# Patient Record
Sex: FEMALE | Race: WHITE | NOT HISPANIC OR LATINO | Employment: OTHER | URBAN - METROPOLITAN AREA
[De-identification: names, ages, dates, MRNs, and addresses within clinical notes are randomized per-mention and may not be internally consistent; named-entity substitution may affect disease eponyms.]

---

## 2021-11-11 ENCOUNTER — TRANSFERRED RECORDS (OUTPATIENT)
Dept: HEALTH INFORMATION MANAGEMENT | Facility: CLINIC | Age: 51
End: 2021-11-11
Payer: COMMERCIAL

## 2021-11-17 ENCOUNTER — MEDICAL CORRESPONDENCE (OUTPATIENT)
Dept: HEALTH INFORMATION MANAGEMENT | Facility: CLINIC | Age: 51
End: 2021-11-17
Payer: COMMERCIAL

## 2021-11-17 ENCOUNTER — TRANSFERRED RECORDS (OUTPATIENT)
Dept: HEALTH INFORMATION MANAGEMENT | Facility: CLINIC | Age: 51
End: 2021-11-17
Payer: COMMERCIAL

## 2021-11-22 ENCOUNTER — TRANSCRIBE ORDERS (OUTPATIENT)
Dept: OTHER | Age: 51
End: 2021-11-22
Payer: COMMERCIAL

## 2021-11-22 DIAGNOSIS — F41.9 ANXIETY: Primary | ICD-10-CM

## 2021-11-22 DIAGNOSIS — M62.559 MUSCLE WASTING AND ATROPHY, NOT ELSEWHERE CLASSIFIED, UNSPECIFIED THIGH: ICD-10-CM

## 2021-11-24 ENCOUNTER — TRANSFERRED RECORDS (OUTPATIENT)
Dept: HEALTH INFORMATION MANAGEMENT | Facility: CLINIC | Age: 51
End: 2021-11-24
Payer: COMMERCIAL

## 2021-12-27 NOTE — TELEPHONE ENCOUNTER
RECORDS RECEIVED FROM: External   REASON FOR VISIT: Muscle wasting and atrophy, not elsewhere classified, unspecified thigh   Date of Appt: 3/14/22   NOTES (FOR ALL VISITS) STATUS DETAILS   OFFICE NOTE from referring provider Received Dr Gooden @ [facility]:  11/17/21  10/26/21   OFFICE NOTE from other specialist N/A    DISCHARGE SUMMARY from hospital N/A    DISCHARGE REPORT from the ER N/A    OPERATIVE REPORT N/A    MEDICATION LIST Received    IMAGING  (FOR ALL VISITS)     EMG Received [facility]:  11/11/21   EEG N/A    LUMBAR PUNCTURE N/A    ANTONIO SCAN N/A    ULTRASOUND (CAROTID BILAT) *VASCULAR* N/A    MRI (HEAD, NECK, SPINE) N/A    CT (HEAD, NECK, SPINE) N/A

## 2022-03-14 ENCOUNTER — OFFICE VISIT (OUTPATIENT)
Dept: NEUROLOGY | Facility: CLINIC | Age: 52
End: 2022-03-14
Attending: PSYCHIATRY & NEUROLOGY
Payer: COMMERCIAL

## 2022-03-14 ENCOUNTER — PRE VISIT (OUTPATIENT)
Dept: NEUROLOGY | Facility: CLINIC | Age: 52
End: 2022-03-14

## 2022-03-14 ENCOUNTER — LAB (OUTPATIENT)
Dept: LAB | Facility: CLINIC | Age: 52
End: 2022-03-14
Attending: PSYCHIATRY & NEUROLOGY
Payer: COMMERCIAL

## 2022-03-14 VITALS
HEART RATE: 70 BPM | SYSTOLIC BLOOD PRESSURE: 144 MMHG | WEIGHT: 149 LBS | RESPIRATION RATE: 16 BRPM | DIASTOLIC BLOOD PRESSURE: 59 MMHG | OXYGEN SATURATION: 100 %

## 2022-03-14 DIAGNOSIS — G72.41 IBM (INCLUSION BODY MYOSITIS) (H): ICD-10-CM

## 2022-03-14 DIAGNOSIS — R29.898 PROXIMAL LEG WEAKNESS: Primary | ICD-10-CM

## 2022-03-14 DIAGNOSIS — R29.898 DECREASED GRIP STRENGTH OF LEFT HAND: ICD-10-CM

## 2022-03-14 DIAGNOSIS — R09.A2 SENSATION OF FOREIGN BODY IN THROAT: ICD-10-CM

## 2022-03-14 PROCEDURE — 81382 HLA II TYPING 1 LOC HR: CPT | Performed by: PATHOLOGY

## 2022-03-14 PROCEDURE — 99000 SPECIMEN HANDLING OFFICE-LAB: CPT | Performed by: PATHOLOGY

## 2022-03-14 PROCEDURE — 99205 OFFICE O/P NEW HI 60 MIN: CPT | Performed by: PSYCHIATRY & NEUROLOGY

## 2022-03-14 PROCEDURE — 83520 IMMUNOASSAY QUANT NOS NONAB: CPT | Mod: 90 | Performed by: PATHOLOGY

## 2022-03-14 PROCEDURE — 36415 COLL VENOUS BLD VENIPUNCTURE: CPT | Performed by: PATHOLOGY

## 2022-03-14 RX ORDER — HYDROCHLOROTHIAZIDE 12.5 MG/1
TABLET ORAL
COMMUNITY

## 2022-03-14 RX ORDER — KRILL/OM-3/DHA/EPA/PHOSPHO/AST 500-110 MG
640 CAPSULE ORAL
COMMUNITY

## 2022-03-14 RX ORDER — ZOLPIDEM TARTRATE 5 MG/1
TABLET ORAL
COMMUNITY

## 2022-03-14 RX ORDER — ESCITALOPRAM OXALATE 10 MG/1
10 TABLET ORAL DAILY
COMMUNITY
End: 2024-08-13

## 2022-03-14 RX ORDER — AMLODIPINE BESYLATE 5 MG/1
TABLET ORAL
COMMUNITY

## 2022-03-14 ASSESSMENT — PAIN SCALES - GENERAL: PAINLEVEL: NO PAIN (0)

## 2022-03-14 NOTE — PATIENT INSTRUCTIONS
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 If the NT5 antibody is positive I don't think you will need a muscle biopsy. If it is negative, I would do the biopsy. Results expected in about 1 month  PT and OT referrals (ordered)  ENT referral for tickling in throat and coughing spells.    I don't see a strong need to assess PFT today but we could do this at follow up.   Follow up 6 months

## 2022-03-14 NOTE — PROGRESS NOTES
Service Date: 2022    Huy Gooden MD  [facility]  [address], [address]  [place]  [address]    RE:  Maria A Tanner  MRN:  [id number]  :   1970    Dear Dr. Gooden:    I had the pleasure to see Maria A Tanner at the [facility].  Thank you for this referral.  She is a 51-year-old woman.  Her mother has inclusion body myositis and she is my patient (Ruby Hull).  I have been following her mother for several years.  Maria A got concerned that she has similar symptoms to her mother.  Her concerns began around 10/2021.  She noticed wasting of the quadriceps muscles, particularly the vastus medialis, initially on the right and then on the left.  She has noticed that it is a little difficult to get up from the ground or low-seated chairs now, and she does not ascend stairs as briskly as she used to.  She does not need to use her arms to get up from the chair.  She had 1 near fall but otherwise no falls. She is not using a cane or walker, and denies difficulty turning in bed or tripping on her foot. For at least 2-3 months she has noticed some weakness of her hand , left more than right, and loss of muscle bulk including the FDI on both hands.  She does not report any fasciculations.  She sometimes has trouble extending her left arm all the way back when she does yoga.  Otherwise, no proximal arm weakness.  She is more clumsy with fine hand movements, although she can do all those independently.     She notes an occasional tickle in her throat for the past couple of years that trigger coughing spells, especially at night, that are becoming uncomfortable.  She feels that sometimes food may be going down the wrong way, either solid or liquid, although she has not had any michell choking episodes and her nutrition has not been compromised.  She denies dyspnea on exertion or orthopnea.  She does not report any change in her voice or speech.   No cognitive concerns voiced by her .      Her mother, in addition to the inclusion body myositis, has been diagnosed with Alzheimer's and epilepsy; she had a muscle biopsy at [facility] which showed all the features of IBM diagnosis including focal invasion of non-necrotic muscle fibers by lymphocytes, rimmed vacuoles and congophilic inclusions.  Mother had genetic testing which showed a variant of unknown significance in MEGF10 gene, which is not associated with hereditary inclusion body myopathies.  There was also pseudodeficiency allele in the GAA gene, also completely unrelated.  Mother did not have any mutations in VCP, desmin, HNRNPA2 or HNRNPB1 genes, which are associated with hereditary forms of IBM.  This was negative.      Maria A has a history of hypertension but otherwise free past medical history.    SOCIAL HISTORY: She does not smoke.  She drinks alcohol a couple of times a week.    CURRENT MEDICATIONS:  Amlodipine, calcium and vitamin D, citalopram, HCTZ, krill oil and Ambien.    ALLERGIES:  Amoxicillin.    BP (!) 144/59   Pulse 70   Resp 16   Wt 67.6 kg (149 lb)   SpO2 100%     PHYSICAL EXAMINATION:  She is awake, alert, oriented x3.  Her cranial nerve exam shows no ptosis or ophthalmoparesis.  Ductions and versions of the eyes are normal.  There is no weakness of orbicularis oculi, oris, uvula, jaw, palate or tongue.  No tongue atrophy or fasciculations.  Lateral tongue movements are done fast and strong.  There is no jaw jerk.  There is no dysphonia or dysarthria.  Her neck flexion and extension strength are full.  Her strength is 5/5 in bilateral deltoids, biceps, triceps and wrist extensors.  Finger extensors are right 5, left 4+, very slightly weak.  FDI and APB are bilaterally 5 despite very subtle atrophy of FDI on both sides.  There are no fasciculations in the upper or lower extremities.  There is mild atrophy of the forearm, left more than right, and there is definite weakness  of the long finger flexors, especially FDP, and FDP to digit 2 on both sides.  I would rate it 4+ on the right, 4 on the left.  Thenar muscle bulk is better preserved.  Her strength is 4+ out of 5 for hip flexors.  I would say 4 for knee extensors, which are a bit weaker than the hip flexors.  There is prominent atrophy of the vastus medialis and lateralis, right more than left. Hamstring strength is normal and so is foot dorsiflexion, and great toe extension.  Tone is normal.  Reflexes are 2+ at the right ankle, 3+ at the left, 2+ at the knees, 2+ at triceps and brachioradialis, biceps is right 2+, left trace to 1+.  Sensory exam is intact to vibration, light touch and pinprick.  Finger-to-nose is done without dysmetria.  I did not examine her gait.    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  She had myopathic changes on EMG done at [facility] without irritability.  She has selective weakness of the quadriceps and long finger flexors which is very typical for inclusion body myositis.  I told her that this is a very unusual situation.  Inclusion body myositis is most often a sporadic disease and it is rarely inherited from generation to generation.  However, she may belong to 2%-3% of IBM families where the trait can be passed in an autosomal dominant fashion, and this is related to a certain HLA subtype (DR3).  We will do HLA typing to test it.  I will have to emphasize that this is distinctly different from the hereditary inclusion body myopathies associated with VCP, desmin or other mutations.  Those have an earlier age of onset and quite different pattern of weakness, not selectively affecting the quadriceps and finger flexors.  Her mother was tested for those mutations and turned out negative.      Maria A understands that there is no disease-modifying treatment currently for IBM and it is a slowly progressive muscle disease, which after years can cause significant disability.  Right now, we will  have her evaluated by Physical and Occupational Therapy.  For the tickling sensation in her throat that triggers coughing spells, I will refer her to ENT, in case this is an early manifestation of IBM or something else.      She asked me whether we should do pulmonary function tests today, and I do not have a strong reason to do that, but we can check it at the followup appointment.  We will also test [id number] IgG antibodies.  This is a serum biomarker of inclusion body myositis with modest sensitivity of about 60%.  The specificity, however, is high, so if the test returns positive with this clinical scenario, she may not need a muscle biopsy.  If the test returns negative, however, I would do the biopsy to confirm, with the caveat that biopsies done in the first 2-3 years after symptom onset often do not show all the canonical pathologic features of IBM and this still does not exclude the diagnosis.  Xenia understands those limitations very well.  She will be seen forfollowup in our clinic in 6 months or sooner if needed.    Total time spent on this encounter today 60 minutes, including 40 face-to-face, 10 on post-visit note dictation, editing and orders, and 10 in pre-visit chart review.    Sincerely,        Pepe Dick MD    cc:  Mila Dick MD        D: 2022   T: 2022   MT: jude    Name:     XENIA GABRIEL  MRN:      [id number]        Account:      [id number]   :      1970           Service Date: 2022       Document: [id number]

## 2022-03-14 NOTE — LETTER
3/14/2022       RE: Maria A Love  [address] [address] [place] [id number]     Dear Colleague,    Thank you for referring your patient, Maria A Love, to the [facility] [place] [facility]. Please see a copy of my visit note below.    Service Date: 2022    Huy Gooden MD  [facility]  [address]  [place] [id number]    RE:  Maria A Tanner  MRN:  [id number]  :   1970    Dear Dr. Gooden:    I had the pleasure to see Maria A Tanner at the [facility].  Thank you for this referral.  She is a 51-year-old woman.  Her mother has inclusion body myositis and she is my patient (Ruby Hull).  I have been following her mother for several years.  Maria A got concerned that she has similar symptoms to her mother.  Her concerns began around 10/2021.  She noticed wasting of the quadriceps muscles, particularly the vastus medialis, initially on the right and then on the left.  She has noticed that it is a little difficult to get up from the ground or low-seated chairs now, and she does not ascend stairs as briskly as she used to.  She does not need to use her arms to get up from the chair.  She had 1 near fall but otherwise no falls. She is not using a cane or walker, and denies difficulty turning in bed or tripping on her foot. For at least 2-3 months she has noticed some weakness of her hand , left more than right, and loss of muscle bulk including the FDI on both hands.  She does not report any fasciculations.  She sometimes has trouble extending her left arm all the way back when she does yoga.  Otherwise, no proximal arm weakness.  She is more clumsy with fine hand movements, although she can do all those independently.     She notes an occasional tickle in her throat for the past couple of years that trigger coughing spells, especially at night,  that are becoming uncomfortable.  She feels that sometimes food may be going down the wrong way, either solid or liquid, although she has not had any michell choking episodes and her nutrition has not been compromised.  She denies dyspnea on exertion or orthopnea.  She does not report any change in her voice or speech.  No cognitive concerns voiced by her .      Her mother, in addition to the inclusion body myositis, has been diagnosed with Alzheimer's and epilepsy; she had a muscle biopsy at [facility] which showed all the features of IBM diagnosis including focal invasion of non-necrotic muscle fibers by lymphocytes, rimmed vacuoles and congophilic inclusions.  Mother had genetic testing which showed a variant of unknown significance in MEGF10 gene, which is not associated with hereditary inclusion body myopathies.  There was also pseudodeficiency allele in the GAA gene, also completely unrelated.  Mother did not have any mutations in VCP, desmin, HNRNPA2 or HNRNPB1 genes, which are associated with hereditary forms of IBM.  This was negative.      Maria A has a history of hypertension but otherwise free past medical history.    SOCIAL HISTORY: She does not smoke.  She drinks alcohol a couple of times a week.    CURRENT MEDICATIONS:  Amlodipine, calcium and vitamin D, citalopram, HCTZ, krill oil and Ambien.    ALLERGIES:  Amoxicillin.    BP (!) 144/59   Pulse 70   Resp 16   Wt 67.6 kg (149 lb)   SpO2 100%     PHYSICAL EXAMINATION:  She is awake, alert, oriented x3.  Her cranial nerve exam shows no ptosis or ophthalmoparesis.  Ductions and versions of the eyes are normal.  There is no weakness of orbicularis oculi, oris, uvula, jaw, palate or tongue.  No tongue atrophy or fasciculations.  Lateral tongue movements are done fast and strong.  There is no jaw jerk.  There is no dysphonia or dysarthria.  Her neck flexion and extension strength are full.  Her strength is 5/5 in bilateral deltoids, biceps,  triceps and wrist extensors.  Finger extensors are right 5, left 4+, very slightly weak.  FDI and APB are bilaterally 5 despite very subtle atrophy of FDI on both sides.  There are no fasciculations in the upper or lower extremities.  There is mild atrophy of the forearm, left more than right, and there is definite weakness of the long finger flexors, especially FDP, and FDP to digit 2 on both sides.  I would rate it 4+ on the right, 4 on the left.  Thenar muscle bulk is better preserved.  Her strength is 4+ out of 5 for hip flexors.  I would say 4 for knee extensors, which are a bit weaker than the hip flexors.  There is prominent atrophy of the vastus medialis and lateralis, right more than left. Hamstring strength is normal and so is foot dorsiflexion, and great toe extension.  Tone is normal.  Reflexes are 2+ at the right ankle, 3+ at the left, 2+ at the knees, 2+ at triceps and brachioradialis, biceps is right 2+, left trace to 1+.  Sensory exam is intact to vibration, light touch and pinprick.  Finger-to-nose is done without dysmetria.  I did not examine her gait.    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  She had myopathic changes on EMG done at [facility] without irritability.  She has selective weakness of the quadriceps and long finger flexors which is very typical for inclusion body myositis.  I told her that this is a very unusual situation.  Inclusion body myositis is most often a sporadic disease and it is rarely inherited from generation to generation.  However, she may belong to 2%-3% of IBM families where the trait can be passed in an autosomal dominant fashion, and this is related to a certain HLA subtype (DR3).  We will do HLA typing to test it.  I will have to emphasize that this is distinctly different from the hereditary inclusion body myopathies associated with VCP, desmin or other mutations.  Those have an earlier age of onset and quite different pattern of weakness, not  selectively affecting the quadriceps and finger flexors.  Her mother was tested for those mutations and turned out negative.      Xenia understands that there is no disease-modifying treatment currently for IBM and it is a slowly progressive muscle disease, which after years can cause significant disability.  Right now, we will have her evaluated by Physical and Occupational Therapy.  For the tickling sensation in her throat that triggers coughing spells, I will refer her to ENT, in case this is an early manifestation of IBM or something else.      She asked me whether we should do pulmonary function tests today, and I do not have a strong reason to do that, but we can check it at the followup appointment.  We will also test [id number] IgG antibodies.  This is a serum biomarker of inclusion body myositis with modest sensitivity of about 60%.  The specificity, however, is high, so if the test returns positive with this clinical scenario, she may not need a muscle biopsy.  If the test returns negative, however, I would do the biopsy to confirm, with the caveat that biopsies done in the first 2-3 years after symptom onset often do not show all the canonical pathologic features of IBM and this still does not exclude the diagnosis.  Xenia understands those limitations very well.  She will be seen forfollowup in our clinic in 6 months or sooner if needed.    Total time spent on this encounter today 60 minutes, including 40 face-to-face, 10 on post-visit note dictation, editing and orders, and 10 in pre-visit chart review.    Pepe Dick MD      cc:  Mila Perez          D: 2022   T: 2022   MT: jude    Name:     XENIA GABRIEL  MRN:      [id number]        Account:      [id number]   :      1970           Service Date: 2022     Document: [id number]

## 2022-03-14 NOTE — NURSING NOTE
Chief Complaint   Patient presents with     Consult     NEW - muscle wasting and atrophy     Kai Dee

## 2022-03-21 NOTE — TELEPHONE ENCOUNTER
FUTURE VISIT INFORMATION      FUTURE VISIT INFORMATION:    Date: 6/23/22    Time: 1PM    Location: [facility]  REFERRAL INFORMATION:    Referring provider:  Pepe Dick MD    Referring providers clinic:  [facility]     Reason for visit/diagnosis  IBM (inclusion body myositis)/Sensation of foreign body in throat - Referred by Pepe Dick MD in [facility]    RECORDS REQUESTED FROM:       Clinic name Comments Records Status Imaging Status   [facility]  3/14/22 note and referral from Pepe Dick MD Epic

## 2022-03-22 LAB
DPA1*: NORMAL
DPB1*: NORMAL
DPB1*LOCUS: NORMAL
DQA1*: NORMAL
DQA1*LOCUS: NORMAL
DQB1*: NORMAL
DQB1*LOCUS SEROLOGIC EQUIVALENT: 2
DQB1*LOCUS: NORMAL
DQB1*SEROLOGIC EQUIVALENT: 7
DRB1*: NORMAL
DRB1*LOCUS SEROLOGIC EQUIVALENT: 17
DRB1*LOCUS: NORMAL
DRB1*SEROLOGIC EQUIVALENT: 11
DRB3*LOCUS SEROLOGIC EQUIVALENT: 52
DRB3*LOCUS: NORMAL
DRSSO TEST METHOD: NORMAL
ZZZDRNGS COMMENTS: NORMAL

## 2022-04-03 ENCOUNTER — HEALTH MAINTENANCE LETTER (OUTPATIENT)
Age: 52
End: 2022-04-03

## 2022-04-11 LAB — SCANNED LAB RESULT: NORMAL

## 2022-04-25 LAB — SCANNED LAB RESULT: NORMAL

## 2022-05-25 ENCOUNTER — HOSPITAL ENCOUNTER (OUTPATIENT)
Dept: PHYSICAL THERAPY | Facility: REHABILITATION | Age: 52
Discharge: HOME | End: 2022-05-25
Payer: COMMERCIAL

## 2022-05-25 ENCOUNTER — HOSPITAL ENCOUNTER (OUTPATIENT)
Dept: OCCUPATIONAL THERAPY | Facility: REHABILITATION | Age: 52
Discharge: HOME | End: 2022-05-25
Attending: PSYCHIATRY & NEUROLOGY
Payer: COMMERCIAL

## 2022-05-25 DIAGNOSIS — R29.898 PROXIMAL LEG WEAKNESS: Primary | ICD-10-CM

## 2022-05-25 DIAGNOSIS — R29.898 DECREASED GRIP STRENGTH OF LEFT HAND: ICD-10-CM

## 2022-05-25 DIAGNOSIS — G72.41 IBM (INCLUSION BODY MYOSITIS) (H): ICD-10-CM

## 2022-05-25 DIAGNOSIS — Z78.9 DECREASED ACTIVITIES OF DAILY LIVING (ADL): Primary | ICD-10-CM

## 2022-05-25 DIAGNOSIS — M79.674 GREAT TOE PAIN, RIGHT: ICD-10-CM

## 2022-05-25 PROCEDURE — 97535 SELF CARE MNGMENT TRAINING: CPT | Mod: GO | Performed by: OCCUPATIONAL THERAPIST

## 2022-05-25 PROCEDURE — 97165 OT EVAL LOW COMPLEX 30 MIN: CPT | Mod: GO | Performed by: OCCUPATIONAL THERAPIST

## 2022-05-25 PROCEDURE — 97161 PT EVAL LOW COMPLEX 20 MIN: CPT | Mod: GP | Performed by: PHYSICAL THERAPIST

## 2022-05-25 PROCEDURE — 97110 THERAPEUTIC EXERCISES: CPT | Mod: GP | Performed by: PHYSICAL THERAPIST

## 2022-05-26 NOTE — PROGRESS NOTES
05/25/22 1100   General Information   Type of Visit Initial OP Ortho PT Evaluation   Start of Care Date 05/25/22   Referring Physician Pepe Dick MD   Patient/Family Goals Statement To preserve my strength for as long as possible   Orders Evaluate and Treat   Date of Order 03/14/22   Certification Required? No   Medical Diagnosis R29.898 (ICD-10-CM) - Proximal leg weakness;G72.41 (ICD-10-CM) - IBM (inclusion body myositis)   Surgical/Medical history reviewed Yes   Precautions/Limitations no known precautions/limitations       Present No   Body Part(s)   Body Part(s) Hip;Lumbar Spine/SI;Knee   Presentation and Etiology   Pertinent history of current problem (include personal factors and/or comorbidities that impact the POC) The patient reports that she doesn't have an official diagnosis.  Her hands are weak and her quads are disappearing.  She feels like she needs lower back support when sitting because of lack of abdominal strength.  She has several lumbar pillows throughout her home for support.  Her neck has also been bothering her from posturing when biking.  She does get fatigued, has difficulty getting up from a chair, stairs, hiking longer distances.   Impairments F. Decreased strength and endurance   Functional Limitations perform desired leisure / sports activities;perform activities of daily living   Symptom Location Bilateral lower extremities R > L; R great toe   How/Where did it occur From insidious onset   Onset date of current episode/exacerbation 03/14/22   Chronicity Chronic   Pain rating (0-10 point scale) Best (/10);Worst (/10)   Best (/10) 0   Worst (/10) 8   Pain quality A. Sharp;H. Other   Pain quality comment tight   Frequency of pain/symptoms C. With activity   Pain/symptoms are: The same all the time   Pain/symptoms exacerbated by G. Certain positions   Pain/symptoms eased by E. Changing positions   Progression of symptoms since onset: Other   Pain  progression comment Toe getting worse, knee is better overall   Current Level of Function   Patient role/employment history A. Employed   Employment Comments Artist   Living environment [facility]   Fall Risk Screen   Fall screen completed by PT   Have you fallen 2 or more times in the past year? No   Have you fallen and had an injury in the past year? No   Is patient a fall risk? No   Abuse Screen (yes response referral indicated)   Feels Unsafe at Home or Work/School no   Feels Threatened by Someone no   Does Anyone Try to Keep You From Having Contact with Others or Doing Things Outside Your Home? no   Physical Signs of Abuse Present no   Patient needs abuse support services and resources No   System Outcome Measures   Outcome Measures   (LEFS: 62/80)   Knee Objective Findings   Knee Special Test Comments APTA STS: 12 in 30 seconds   Hip Objective Findings   Hip Flexibility Comments WNL   Hip/Knee Strength Comments 4/5 hip abduction/adduction, knee extension R; 4+/5 L   Lumbar Spine/SI Objective Findings   Flexion ROM Increased muscle tone R side; WNL   Extension ROM WNL   Right Side Bending ROM WNL   Left Side Bending ROM WNL   Palpation 1st MET head larger R compared to L and tenderness noted   Posture Min increased lumbar lordosis   Planned Therapy Interventions   Planned Therapy Interventions neuromuscular re-education;ROM;strengthening;stretching   Planned Modality Interventions   Planned Modality Interventions Electrical stimulation   Clinical Impression   Criteria for Skilled Therapeutic Interventions Met yes, treatment indicated   PT Diagnosis Impaired LE strength secondary to likely IBM   Influenced by the following impairments Strength, pain   Functional limitations due to impairments Yoga, stairs, getting up from a chair   Clinical Presentation Evolving/Changing   Clinical Presentation Rationale Progressive disease   Clinical Decision Making (Complexity) Moderate complexity   Therapy Frequency   (1x  every other week to every 2 weeks)   Predicted Duration of Therapy Intervention (days/wks) 24 weeks, 12 visits   Risk & Benefits of therapy have been explained Yes   Patient, Family & other staff in agreement with plan of care Yes   Clinical Impression Comments Maria A Love is a 52 year old female who presents to PT with c/o progressive muscle weakness and R great toe pain.  Initial onset of symptoms was 2+ years ago with great toe pain beginning more recently.  She has PMH positive for no known injury, but family hx of IBM.  Her pain symptoms are rated 0-8/10 and described as sharp and tight in nature.  This pain is making it difficult for her to participate in yoga, and the progressive muscle weakness is making functional tasks such as stairs and getting up from a chair more difficult.  On exam, she demonstrates impaired LE muscle strength R > L, most evident in the quadriceps musculature.  She demonstrates symptoms of IBM, but has not yet received an official diagnosis.  She is appropriate for skilled 1:1 PT services to address the listed impairments and maintain functional independence.   ORTHO GOALS   PT Ortho Eval Goals 1;2;3;4   Ortho Goal 1   Goal Identifier Self-management/HEP   Goal Description Patient will be independent in self-management of condition and HEP.   Target Date 08/03/22   Ortho Goal 2   Goal Identifier APTA STS   Goal Description Patient will improve APTA STS to 14 repetitions in 30 seconds to demonstrate improved functional strength.   Target Date 08/03/22   Ortho Goal 3   Goal Identifier Stairs   Goal Description Patient will be able to maneuver stairs in her home without difficulty to demonstrate improved function.   Target Date 08/03/22   Ortho Goal 4   Goal Identifier Yoga   Goal Description Patient will be able to participate in yoga 1x/week without pain in her R great toe.   Target Date 08/03/22   Total Evaluation Time   PT Yon Low Complexity Minutes ([id number]) 25     Kasandra HULL  Magdaleno, PT, DPT, [facility]  5/25/2022

## 2022-05-27 DIAGNOSIS — Z11.59 ENCOUNTER FOR SCREENING FOR OTHER VIRAL DISEASES: Primary | ICD-10-CM

## 2022-06-01 ENCOUNTER — TELEPHONE (OUTPATIENT)
Dept: NEUROLOGY | Facility: CLINIC | Age: 52
End: 2022-06-01
Payer: COMMERCIAL

## 2022-06-01 NOTE — TELEPHONE ENCOUNTER
[facility] Call Center    Phone Message    May a detailed message be left on voicemail: yes     Reason for Call: Other: Pt called and stated that she was told to leave a message for Gladis, Dr. Calvert's nurse if there were any questions regarding her muscle biopsy. Pt stated that there was some confusion on where the biopsy was being taken from. Please call back with futher information.      Action Taken: Message routed to:  [facility]    Travel Screening: Not Applicable

## 2022-06-02 ENCOUNTER — LAB (OUTPATIENT)
Dept: LAB | Facility: CLINIC | Age: 52
End: 2022-06-02
Attending: PSYCHIATRY & NEUROLOGY
Payer: COMMERCIAL

## 2022-06-02 DIAGNOSIS — Z11.59 ENCOUNTER FOR SCREENING FOR OTHER VIRAL DISEASES: ICD-10-CM

## 2022-06-02 PROCEDURE — U0005 INFEC AGEN DETEC AMPLI PROBE: HCPCS

## 2022-06-02 PROCEDURE — U0003 INFECTIOUS AGENT DETECTION BY NUCLEIC ACID (DNA OR RNA); SEVERE ACUTE RESPIRATORY SYNDROME CORONAVIRUS 2 (SARS-COV-2) (CORONAVIRUS DISEASE [COVID-19]), AMPLIFIED PROBE TECHNIQUE, MAKING USE OF HIGH THROUGHPUT TECHNOLOGIES AS DESCRIBED BY CMS-2020-01-R: HCPCS

## 2022-06-03 LAB — SARS-COV-2 RNA RESP QL NAA+PROBE: NEGATIVE

## 2022-06-06 ENCOUNTER — HOSPITAL ENCOUNTER (OUTPATIENT)
Facility: AMBULATORY SURGERY CENTER | Age: 52
Discharge: HOME | End: 2022-06-06
Attending: PSYCHIATRY & NEUROLOGY | Admitting: PSYCHIATRY & NEUROLOGY
Payer: COMMERCIAL

## 2022-06-06 VITALS
BODY MASS INDEX: 21.19 KG/M2 | OXYGEN SATURATION: 100 % | HEART RATE: 53 BPM | RESPIRATION RATE: 16 BRPM | HEIGHT: 70 IN | WEIGHT: 148 LBS | DIASTOLIC BLOOD PRESSURE: 67 MMHG | TEMPERATURE: 97 F | SYSTOLIC BLOOD PRESSURE: 132 MMHG

## 2022-06-06 DIAGNOSIS — R53.1 WEAKNESS: Primary | ICD-10-CM

## 2022-06-06 PROCEDURE — 88319 ENZYME HISTOCHEMISTRY: CPT | Mod: 26 | Performed by: PSYCHIATRY & NEUROLOGY

## 2022-06-06 PROCEDURE — 20205 DEEP MUSCLE BIOPSY: CPT | Performed by: PSYCHIATRY & NEUROLOGY

## 2022-06-06 PROCEDURE — 88305 TISSUE EXAM BY PATHOLOGIST: CPT | Mod: 26 | Performed by: PSYCHIATRY & NEUROLOGY

## 2022-06-06 PROCEDURE — 88313 SPECIAL STAINS GROUP 2: CPT | Mod: 26 | Performed by: PSYCHIATRY & NEUROLOGY

## 2022-06-06 PROCEDURE — 88314 HISTOCHEMICAL STAINS ADD-ON: CPT | Mod: 26 | Performed by: PSYCHIATRY & NEUROLOGY

## 2022-06-06 NOTE — OP NOTE
PREOPERATIVE DIAGNOSIS: Myopathy    POSTOPERATIVE DIAGNOSIS: Myopathy    OPERATION: Left vastus lateralis muscle biopsy    INDICATIONS FOR PROCEDURE: Rule out Inclusion Body Myositis    DESCRIPTION OF PROCEDURE:  Informed consent was obtained on the patient to do a deep left vastus lateralis muscle biopsy. The left thigh was prepped with cholorprep and sterilely draped. Lidocaine 1% 23 mL total was injected locally during the biopsy procedure. A 1-inch incision was made over lateral thigh and the vastus lateralis muscle was exposed. Three small pieces were biopsied for routine histochemistry, electron microscopy, and metabolic studies as needed. The fascia and subcutaneous tissues were closed with 3-0 Vicryl and skin with 4-0 Vicryl. Steri-Strips and pressure dressing were applied over the wound site. There were no complications. Blood loss was minimal. Wound care instructions were given to the patient. She told that results of the biopsy would be available in 2-3 weeks.

## 2022-06-06 NOTE — DISCHARGE INSTRUCTIONS
[facility]  Home Care Following Your Procedure  Call a doctor if you have signs of infection (fever, growing tenderness at the surgery site, a large amount of drainage or bleeding, severe pain, foul-smelling drainage, redness, swelling).         Tylenol/Acetaminophen Consumption  To help encourage the safe use of acetaminophen, the makers of TYLENOL  have lowered the maximum daily dose for single-ingredient Extra Strength TYLENOL  (acetaminophen) products sold in the [place] from 8 pills per day (4,000 mg) to 6 pills per day (3,000 mg). The dosing interval has also changed from 2 pills every 4-6 hours to 2 pills every 6 hours.  If you feel your pain relief is insufficient, you may take Tylenol/Acetaminophen in addition to your narcotic pain medication.   Be careful not to exceed 3,000 mg of Tylenol/Acetaminophen in a 24 hour period from all sources.  If you are taking extra strength Tylenol/acetaminophen (500 mg), the maximum dose is 6 tablets in 24 hours.  If you are taking regular strength acetaminophen (325 mg), the maximum dose is 9 tablets in 24 hours.  Your doctor is:  Dr. Jj Calvert, General Surgery: [phone]                                    Or dial [phone] and ask for the resident on call for:  General Surgery  For emergency care, call the:  [place]:  [phone] (TTY for hearing impaired: [phone])

## 2022-06-21 NOTE — PROGRESS NOTES
[facility]   at the [facility]   Otolaryngology Clinic     Patient: Maria A Love    MRN: [id number]    : 1970    Age/Gender: 52 year old female  Date of Service: 2022  Rendering Provider:   Rhoda Proctor MD     Referring Provider   PCP: Mila Perez  Referring Physician: Pepe Dick MD  [address] [place], [place] [id number], [place] [address]  Reason for Consultation   Chronic cough/throat clearing  Inclusion body myositis  History   HISTORY OF PRESENT ILLNESS: Ms. Love is a 52 year old female who presents to us today for consultation.      She presents today for evaluation. she reports:    Dysphonia  - denies    Dysphagia  - recent diagnosis of IBM    Dyspnea  - prior history of exercise induced asthma   - not anymore    Throat clearing/cough  - 12x in the past 3 days  - feels an itch  - she coughs so hard it is hard to control  - she has throat clearing   - the cough usually comes when she is talking  - last one she was in a pottery class  - she wakes up at night with a very dry mouth     GERD/LPRD   - denies   - had an endoscopy and was placed on prevacid for stomach discomfort    PAST MEDICAL HISTORY: No past medical history on file.    PAST SURGICAL HISTORY:   Past Surgical History:   Procedure Laterality Date     BIOPSY MUSCLE DIAGNOSTIC (LOCATION) Left 2022    Procedure: BIOPSY, MUSCLE LEFT QUADRICEPS;  Surgeon: Jj Calvert MD;  Location: [facility] OR     CURRENT MEDICATIONS:   Current Outpatient Medications:      amLODIPine (NORVASC) 5 MG tablet, amlodipine 5 mg tablet  TAKE 1 TABLET DAILY, Disp: , Rfl:      Calcium Carbonate (CALCARB 600 PO), , Disp: , Rfl:      cholecalciferol 50 MCG (2000) tablet, Take by mouth daily, Disp: , Rfl:      escitalopram (LEXAPRO) 10 MG tablet, Take 10 mg by mouth daily, Disp: , Rfl:      hydrochlorothiazide (HYDRODIURIL) 12.5 MG tablet, hydrochlorothiazide 12.5 mg tablet  TAKE 1 TABLET DAILY (NEED  APPOINTMENT), Disp: , Rfl:      Krill Oil (OMEGA-3) 500 MG CAPS, Take 640 mg by mouth, Disp: , Rfl:      zolpidem (AMBIEN) 5 MG tablet, zolpidem 5 mg tablet  Take 1 tablet as needed by oral route at bedtime., Disp: , Rfl:     ALLERGIES: Amoxicillin    SOCIAL HISTORY:    Social History     Socioeconomic History     Marital status:      Spouse name: Not on file     Number of children: Not on file     Years of education: Not on file     Highest education level: Not on file   Occupational History     Not on file   Tobacco Use     Smoking status: Not on file     Smokeless tobacco: Not on file   Substance and Sexual Activity     Alcohol use: Not on file     Drug use: Not on file     Sexual activity: Not on file   Other Topics Concern     Not on file   Social History Narrative     Not on file     Social Determinants of Health     Financial Resource Strain: Not on file   Food Insecurity: Not on file   Transportation Needs: Not on file   Physical Activity: Not on file   Stress: Not on file   Social Connections: Not on file   Intimate Partner Violence: Not on file   Housing Stability: Not on file       FAMILY HISTORY: No family history on file.  Non-contributory for problems with anesthesia    REVIEW OF SYSTEMS:   The patient was asked a 14 point review of systems regarding constitutional symptoms, eye symptoms, ears, nose, mouth, throat symptoms, cardiovascular symptoms, respiratory symptoms, gastrointestinal symptoms, genitourinary symptoms, musculoskeletal symptoms, integumentary symptoms, neurological symptoms, psychiatric symptoms, endocrine symptoms, hematologic/lymphatic symptoms, and allergic/ immunologic symptoms.   The pertinent factors have been included in the HPI and below.  Patient Supplied Answers to Review of Systems  [facility] ROS 6/20/2022   Neurology: Unexplained weakness   Ears, Nose, Throat: Trouble swallowing       Physical Examination   The patient underwent a physical examination as described  below. The pertinent positive and negative findings are summarized after the description of the examination.  Constitutional: The patient's developmental and nutritional status was assessed. The patient's voice quality was assessed.  Head and Face: The head and face were inspected for deformities. The sinuses were palpated. The salivary glands were palpated. Facial muscle strength was assessed bilaterally.  Eyes: Extraocular movements and primary gaze alignment were assessed.  Ears, Nose, Mouth and Throat: The ears and nose were examined for deformities. The nasal septum, mucosa, and turbinates were inspected by anterior rhinoscopy. The lips, teeth, and gums were examined for abnormalities. The oral mucosa, tongue, palate, tonsils, lateral and posterior pharynx were inspected for the presence of asymmetry or mucosal lesions.    Neck: The tracheal position was noted, and the neck mass palpated to determine if there were any asymmetries, abnormal neck masses, thyromegally, or thyroid nodules.  Respiratory: The nature of the breathing and chest expansion/symmetry was observed.  Cardiovascular: The patient was examined to determine the presence of any edema or jugular venous distension.  Abdomen: The contour of the abdomen was noted.  Lymphatic: The patient was examined for infraclavicular lymphadenopathy.  Musculoskeletal: The patient was inspected for the presence of skeletal deformities.  Extremities: The extremities were examined for any clubbing or cyanosis.  Skin: The skin was examined for inflammatory or neoplastic conditions.  Neurologic: The patient's orientation, mood, and affect were noted. The cranial nerve  functions were examined.  Other pertinent positive and negative findings on physical examination:      OC/OP: no lesions, uvula midline, soft palate elevates symmetrically  Neck: no lesions, no TH tenderness to palpation  All other physical examination findings were within normal limits and  noncontributory.  Procedures   Flexible laryngoscopy (CPT 89671)      Pre-procedure diagnosis: chronic throat clearing  Post-procedure diagnosis: same as above  Indication for procedure: Ms. Love is a 52 year old female with see above  Procedure(s): Fiberoptic Laryngoscopy    Details of Procedure: After informed consent was obtained, the patient was seated in the examination chair.  The areas of the nasopharynx as well as the hypopharynx were anesthetized with topical 4% lidocaine with 0.25% phenylephrine atomizer.  Examination of the base of tongue was performed first.  Attention was directed to any evidence of masses in the area or evidence of leukoplakia or candidal infection.  Attention was directed to the epiglottis where its size and position was determined and its movement on phonation of the vowel  e .  The piriform sinuses were then inspected for any mass lesions or pooling of secretions.  Attention was then directed to the larynx. The vocal folds were inspected for infection or any areas of leukoplakia, for masses, polypoid degeneration, or hemorrhage.  Having done this, the arytenoids and vocal processes were inspected for erythema or evidence of granuloma formation.  The posterior commissure was then inspected for evidence of inflammatory changes in the mucosa and heaping up of mucosal tissue. The patient was then instructed to say the vowel  e .  Adduction of vocal folds to the midline was observed for any evidence of paresis or paralysis of the larynx or asymmetry in rotation of the larynx to the left or right. The patient was asked to breathe and the degree of abduction was noted bilaterally.  Subglottic view of the larynx was obtained for any additional mass lesions or mucosal changes.  Finally the post cricoid was examined for evidence of pooling of secretions, as well as the pharyngeal wall mucosa.   Anesthesia type: 0.25% phenylephrine    Findings:  Anatomic/physiological deviations: RNC,  bilateral septal spurs, mild supraglottic hyperfunction   Right vocal process: No restriction of mobility   Left vocal process: No restriction of mobility  Glottal gap: Complete glottal closure  Supraglottic structures: Normal  Hypopharynx: Normal     Estimated Blood Loss: minimal  Complications: None  Disposition: Patient tolerated the procedure well          Fiberoptic Endoscopic Evaluation of Swallowing (CPT 12773)  and Interpretation of Swallowing (CPT 13236)    Indications: See above notes for complete history and physical. Patient complains of dysphagia to both solids and liquids and/or there is suggestion on history and endoscopic exam of the presence of dysphagia causing medical complaints.  Swallowing evaluation is being performed to assess the presence and degree of dysphagia, and to recommend a safe diet.     Pulmonary Status:  No PNA   Current Diet:              regular                                        thin liquids      Consistency Amounts:  Thin Liquid: sip   Puree: sip  Solid: cookies            Positioning: upright in a chair  Oral Peripheral Exam: See physical exam section.  Anatomic Notes: See Videostroboscopy report for assessment of anatomy and laryngeal functioning  Pharyngeal secretions prior to administration of liquid or food: No  Oral Phase Abnormal Findings: No abnormal behavior observed  Behavioral Adaptations: No abnormal behavior observed  Pharyngeal Phase Abnormal Findings: no penetration, no aspiration    Recommended Diet:  regular                                        thin liquids               Review of Relevant Clinical Data   I personally reviewed:  Notes: Dr. Vance Dick - Neurology 3/14/22    Labs:  No results found for: TSH  No results found for: NA, CO2, BUN, CREAT, GLUCOSE, PHOS  No results found for: WBC, HGB, HCT, MCV, PLT  No results found for: PT, PTT, INR  No results found for: ABE  No components found for: RHEUMATOIDFACTOR,  RF  No results found for:  "CRP  No components found for: CKTOT, URICACID  No components found for: C3, C4, DSDNAAB, NDNAABIFA  No results found for: MPOAB    Patient reported Quality of Life (QOL) Measures   Patient Supplied Answers To VHI Questionnaire  Voice Handicap Index (VHI-10) 6/20/2022   My voice makes it difficult for people to hear me 0   People have difficulty understanding me in a noisy room 0   My voice difficulties restrict my personal and social life.  0   I feel left out of conversations because of my voice 0   My voice problem causes me to lose income 0   I feel as though I have to strain to produce voice 0   The clarity of my voice is unpredictable 0   My voice problem upsets me 0   My voice makes me feel handicapped 0   People ask, \"What's wrong with your voice?\" 0   VHI-10 0     Patient Supplied Answers To EAT Questionnaire  Eating Assessment Tool (EAT-10) 6/20/2022   My swallowing problem has caused me to lose weight 1   My swallowing problem interferes with my ability to go out for meals 0   Swallowing liquids takes extra effort 0   Swallowing solids takes extra effort 1   Swallowing pills takes extra effort 1   Swallowing is painful 0   The pleasure of eating is affected by my swallowing 1   When I swallow food sticks in my throat 2   I cough when I eat 1   Swallowing is stressful 1   EAT-10 8     Patient Supplied Answers To CSI Questionnaire  Cough Severity Index (CSI) 6/20/2022   My cough is worse when I lie down 1   My coughing problem causes me to restrict my personal and social life 2   I tend to avoid places because of my cough problem 2   I feel embarrassed because of my coughing problem 2   People ask, ''What's wrong?'' because I cough a lot 1   I run out of air when I cough 2   My coughing problem affects my voice 2   My coughing problem limits my physical activity 1   My coughing problem upsets me 2   People ask me if I am sick because I cough a lot 1   CSI Score 16     Patient Supplied Answers to Dyspnea " Index Questionnaire:  No flowsheet data found.    Impression & Plan     IMPRESSION: Ms. Love is a 52 year old female who is being seen for the followin. Dysphagia  - avoids certain foods   - has a recent diagnosis of IBM  - FEES shows no penetration, aspiration or residue   Plan  - Xray Video Swallow Exam with esophagram in the future if develops swallow symptoms      2. Chronic cough/throat clearing  - started 3 years ago   - has stayed the same overtime  - associated with sensation of tickle when she is talking and gives her a big coughing fit that is significant - about 12x/ in the past 3 years  - she also has throat clearing  - allergy triggers and work up: denies  - pulmonary triggers and work up: had prior exercise induced asthma, no longer has difficulty breathing  - GI triggers and work up: worst in the AM, wakes up at night with dry mouth, feels like she can't swallow  - ENT triggers and work up: feels something in the throat that makes her clear her throat, coughing fits are with talking  - scope shows mild supraglottic hyperfunction, no pooling of saliva   - 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 due to irritable larynx syndrome and muscle tension dysphonia  - AM symptoms could be due to reflux - reflux precautions - if not improved then likely due to dry mouth from snoring overnight   - nighttime symptoms of dry mouth are likely due to snoring from nasal septal spurs     Plan  - CXR PA/lateral  - cough/throat clearing suppression therapy  - reflux precautions  - if morning throat clearing not improved with reflux precautions - consider sleep evaluation        RETURN VISIT: as needed if swallowing worsens      Scribe Preparation Attestation:  I, Vanda Massey, a scribe, prepared the chart for today's encounter.         Thank you for the kind referral and for allowing me to share in the care of Ms. Love. If you have any questions, please do not hesitate to contact me.    Rhoda Proctor MD     Laryngology    [facility]  Department of  Otolaryngology - Head and Neck Surgery  Clinics & Surgery Center  [address] [place] [id number]  Appointment line: [phone]  Fax: [phone]  [email or web address]

## 2022-06-23 ENCOUNTER — OFFICE VISIT (OUTPATIENT)
Dept: OTOLARYNGOLOGY | Facility: CLINIC | Age: 52
End: 2022-06-23
Payer: COMMERCIAL

## 2022-06-23 ENCOUNTER — THERAPY VISIT (OUTPATIENT)
Dept: SPEECH THERAPY | Facility: CLINIC | Age: 52
End: 2022-06-23
Payer: COMMERCIAL

## 2022-06-23 ENCOUNTER — PRE VISIT (OUTPATIENT)
Dept: OTOLARYNGOLOGY | Facility: CLINIC | Age: 52
End: 2022-06-23

## 2022-06-23 VITALS
DIASTOLIC BLOOD PRESSURE: 84 MMHG | SYSTOLIC BLOOD PRESSURE: 132 MMHG | HEIGHT: 70 IN | WEIGHT: 145 LBS | HEART RATE: 66 BPM | OXYGEN SATURATION: 98 % | BODY MASS INDEX: 20.76 KG/M2

## 2022-06-23 DIAGNOSIS — G72.41 IBM (INCLUSION BODY MYOSITIS) (H): Primary | ICD-10-CM

## 2022-06-23 DIAGNOSIS — R05.9 COUGH: ICD-10-CM

## 2022-06-23 DIAGNOSIS — R09.89 THROAT CLEARING: ICD-10-CM

## 2022-06-23 DIAGNOSIS — R05.9 COUGH: Primary | ICD-10-CM

## 2022-06-23 DIAGNOSIS — G72.41 IBM (INCLUSION BODY MYOSITIS) (H): ICD-10-CM

## 2022-06-23 DIAGNOSIS — R09.A2 SENSATION OF FOREIGN BODY IN THROAT: ICD-10-CM

## 2022-06-23 DIAGNOSIS — R09.A2 GLOBUS SENSATION: Primary | ICD-10-CM

## 2022-06-23 DIAGNOSIS — J38.7 IRRITABLE LARYNX SYNDROME: ICD-10-CM

## 2022-06-23 DIAGNOSIS — R13.12 OROPHARYNGEAL DYSPHAGIA: ICD-10-CM

## 2022-06-23 PROCEDURE — 92612 ENDOSCOPY SWALLOW (FEES) VID: CPT | Mod: GN | Performed by: OTOLARYNGOLOGY

## 2022-06-23 PROCEDURE — 92524 BEHAVRAL QUALIT ANALYS VOICE: CPT | Mod: GN | Performed by: SPEECH-LANGUAGE PATHOLOGIST

## 2022-06-23 PROCEDURE — 99204 OFFICE O/P NEW MOD 45 MIN: CPT | Mod: 25 | Performed by: OTOLARYNGOLOGY

## 2022-06-23 PROCEDURE — 92613 ENDOSCOPY SWALLOW (FEES) I&R: CPT | Performed by: OTOLARYNGOLOGY

## 2022-06-23 PROCEDURE — 92610 EVALUATE SWALLOWING FUNCTION: CPT | Mod: GN | Performed by: SPEECH-LANGUAGE PATHOLOGIST

## 2022-06-23 ASSESSMENT — PAIN SCALES - GENERAL: PAINLEVEL: NO PAIN (0)

## 2022-06-23 NOTE — PROGRESS NOTES
Speech-Language Pathology Department   EVALUATION  [facility]  Clinical Swallow Evaluation with endoscopic view provided by MD      06/23/22 1300   General Information   Type Of Visit Initial   Start Of Care Date 06/23/22   Referring Physician Dr. Rhoda Proctor   Orders Evaluate And Treat   Orders Comment Clinical Swallow Evaluation with endoscopic visualization provided by ENT   Medical Diagnosis IBM   Precautions/limitations No Known Precautions/limitations   Hearing Adequate in quiet setting   Pertinent History of Current Problem/OT: Additional Occupational Profile Info Maria A Love is a 52-year-old woman who was referred to laryngology for globus sensation and cough by Dr. Dick. Her mother has IBM and she is undergoing work up for this disease. Today she reports having some intermittent difficulty with swallowing. She notes over memorial day weekend, she was eating bread and felt like it didn't go down so well. Since her mother has IBM, she is aware of the changes in swallowing function. She is hopeful to maintain swallow function as long as possible. She denies coughing in association with po intake but does have coughing which wakes her up at night and coughing that will be so remarkable, she ends up with a nose bleed.   Respiratory Status Room air   Prior Level Of Function Swallowing   Prior Level Of Function Comment Regular solids and thin liquids   General Observations Pt highly pleasant and cooperative throughout evaluation   Patient/family Goals Pt would like to optimize swallow function as long as possible.   Clinical Swallow Evaluation   Oral Musculature generally intact   Dentition present and adequate   Mucosal Quality good   Mandibular Strength and Mobility intact   Oral Labial Strength and Mobility WFL   Lingual Strength and Mobility WFL   Velar Elevation intact   Buccal Strength and Mobility intact   Laryngeal Function Cough;Throat  clear;Swallow;Voicing initiated   Clinical Swallow Eval: Thin Liquid Texture Trial   Mode of Presentation, Thin Liquids straw   Volume of Liquid or Food Presented 3 oz blue tinged milk   Oral Phase of Swallow WFL   Pharyngeal Phase of Swallow intact   Diagnostic Statement No aspiration/penetration noted on thin liquid trials.   Clinical Swallow Eval: Puree Solid Texture Trial   Mode of Presentation, Puree self-fed;spoon   Volume of Puree Presented tsp applesauce tinged blue x2   Oral Phase, Puree WFL   Pharyngeal Phase, Puree intact   Diagnostic Statement No aspiration/penetration noted on puree consistency trials.   Clinical Swallow Eval: Regular (Solid)   Mode of Presentation self-fed   Volume Presented 1 Dalia Doone Cookie   Oral Phase WFL   Pharyngeal Phase intact   Diagnostic Statement No aspriation/penetration noted on regular consistency solids.   Swallow Eval: Clinical Impressions   Skilled Criteria for Therapy Intervention No problems identified which require skilled intervention   Functional Assessment Scale (FAS) 7   Diet texture recommendations Thin liquids (level 0);Regular diet   Predicted Duration of Therapy Intervention (days/wks) Evaluation only   Risks and Benefits of Treatment have been explained. Yes   Patient, family and/or staff in agreement with Plan of Care Yes   Clinical Impression Comments Maria A Kate demonstrates safe functional oropharyngeal swallow. No aspiration/penetration noted on any consistency presented. Timely swallow on all trials. No pharyngeal residue demonstrated on any consistency after the completed swallow. Adequate ROM and strength of oral mechanism demonstrated. Good natural dentition. Adequate velar elevation. Recommend regular consistency solids and thin liquids. Sit upright for po intake. Encourage small bites/sips and slow rate. No further SLP services indicated at this time for swallowing. Pt will work with the on's voice therapy SLP team to decrease throat  clear/coughing and vocal hyperfunction. She will benefit from video swallow study at some point to further assess pharyngeal phase as her swallow symptoms change and increase.   Total Session Time   SLP Eval: oral/pharyngeal swallow function, clinical minutes (17788) 11   Total Evaluation Time 11       Thank you for the referral of Maria A Love. If you have any questions about this report, please contact me using the information below.      Richa Aleman MS, CCC-SLP  Speech-Language Pathology  [facility]  Department of Otolaryngology/D&T - 4th floor  Pager: [phone]  Phone: [phone]  Email: [email or web address]

## 2022-06-23 NOTE — LETTER
"6/23/2022       RE: Maria A Love  [id number] [address] [place] [id number]     Dear Colleague,    Thank you for referring your patient, Maria A Love, to the [facility]ICE [facility] [place] [facility]. Please see a copy of my visit note below.    [facility]  Allen Harper Jr., M.D., F.A.C.S.  Hailey Marvin M.D., M.P.H.  Rhoda Proctor M.D.  Dunia Son, Ph.D., CCC-SLP  Manjinder Cardenas, Ph.D., CCC-SLP  Tash Marin M.M. (voice), M.A., CCC-SLP  Rio Estevez M.M. (voice), M.A., CCC-SLP  NAKUL Baez (voice), M.S., CCC-SLP    [facility]  VOICE/SPEECH/BREATHING EVALUATION AND LARYNGEAL EXAMINATION REPORT    Patient: Maria A Love  Date of Visit: 6/23/2022    Clinician: Dunia Son, Ph.D., CCC/SLP  Seen in conjunction with: Dr. Rhoda Proctor  We are also joined by Swallow Specialty SLP Richa Aleman  Referring Physician: Dr. Dick    CHIEF COMPLAINT:  Cough and throat clear, as well as swallowing problems    HISTORY  Maria A Love is a 52 year old presenting today for evaluation of cough and throat clearing; please see Ms. Alemna's note regarding the swallowing problem.    Please refer to Dr. Proctor s dictation for a more complete history and impressions.     Salient history: She has a history significant for a recent diagnosis of inclusion body myositis.    COUGH/THROAT CLEARING    Onset/ inciting factors: Unclear    Progression: Unclear    History of intervention:    Current Symptoms:    Sensation of a tickle in the throat that leads to a cough; episodes become extended and violent, and end with epistaxis    Episodes can be triggered by talking    Episodes not triggered by environmental irritants or temperature changes    Rarely an episode is triggered by eating, these are not quite annual, but \"memorable\"    Improves with: Nothing; she drinks a lot of water, but does not think it matters    Also " has a frequent phlegmy throat clear, that she believes is familial, and a habit    No actual postnasal drainage, but she notes that she sniffs a lot, and also thinks this is a habit    SWALLOWING    Some early problems with swallowing (food will not go down) that she believes may be related to her new diagnosis of [employer], and she would like to prevent as much as possible    Please see Ms. Aleman's note        BREATHING    No complaints      OTHER PERTINENT HISTORY    Unknown; please refer to Dr. Proctor's note and elsewhere in this chart    OBJECTIVE FINDINGS  VOICE/ SPEECH/ NON-COMMUNICATIVE LARYNGEAL BEHAVIORS EVALUATION  An evaluation of the voice and cough was accomplished today; salient features are as follows:    Palpation of the laryngeal area shows:    NoTenderness of the thyrohyoid area     Palpation does not elicit desire to throat clear/cough    Cough/ Throat clear:    Not observed    Breathing pattern:    Appears within normal limits and adequate     No overt tension is evident.    Voice quality is characterized by    Very frequent glottal alvarez with an underlying normal voice quality; within cultural normal limits    Habitual pitch is within normal limits, in the range from approximately F3 to G3    Loudness is WNL and appropriate for the setting    Sustained vowels: All within normal limits    A singing task shows voice quality is consistent between speech and singing; WNL    In a pitch glide task to determine pitch range, she demonstrates normal pitch range    LARYNGEAL EXAMINATION    Endoscopic laryngeal examination was performed by:  Dr. Proctor  I provided the protocol of instructions for the patient.  Type of exam:   Flexible endoscopy with chip-tip technology    This exam shows:  Laryngeal and Vocal Fold Mucosa    Essentially healthy laryngeal mucosa    Presence of secretions is unremarkable; there does not appear to be a secretion management problem    Status of vocal fold mucosa:   o Within normal  limits, with no lesions and straight vibratory margins    Neurological and Functional Integrity of the Larynx    Vocal folds are mobile and meet at midline; movement is brisk and symmetric; exam is neurologically normal     Normal function is evident during a task of 20 quickly repeated vowels    Elongation of the vocal folds for pitch increase is within normal limits    On phonation, glottic closure is not pressed, and often there is a mild anterior gap that increases with talking, resulting in mildly weak glottic closure    Demonstration of a cough is normal; demonstration of a throat clear shows an aggressive throat clear with high impact to the mucosa    Supraglottic Function and Therapy Probes    Variable mild to moderate anterior-posterior constriction of the supraglottic larynx during connected speech    Variable mild to moderate ventricular approximation during phonation;     Supraglottic function during singing is improved    Stroboscopy was not warranted.  FEES was accomplished by Dr. Proctor and Ms. Aleman, showing a strong swallow currently.    Dr. Proctor and I reviewed this laryngeal exam with Ms. Love today, and I provided pertinent explanations:    Endoscopic findings are consistent with audio-perceptual assessment and patient Hx/complaints.    her symptoms are accounted for by a variety of mild findings, including probable mucosal irritation    Her glottal alvarez may be accounted for by the occasional weak and irregular glottic closure    One of her mucosal irritations may be due to her mild to moderate supraglottic constriction    The aggressive throat clear is most likely a cause of mucosal irritation    Because there appears to be a functional component to her symptoms, she would most likely benefit from functional speech therapy.    ASSESSMENT / PLAN  IMPRESSIONS:  This evaluation has resulted in the following diagnosis/diagnoses for Ms. Love  Chronic Cough (R05.3)  Chronic Throat Clearing  (R68.89)  Irritable Larynx Syndrome (ILS) (J38.7).      Laryngeal evaluation demonstrated a small and variable anterior glottic gap that may account for frequent glottal alvarez; aggressive throat clearing that may be a source of mucosal irritation; mild to moderate supraglottic constriction that may be a source of laryngeal irritation    Perceptual evaluation demonstrated frequent glottal alvarez cough and throat clear were not observed in today's; visit  RECOMMENDATIONS:     A course of speech therapy is recommended to optimize vocal technique and help reduce chronic cough, throat clear and mucosal irritation.    She demonstrates a Good prognosis for improvement given adherence to therapeutic recommendations. Therapeutic     Positive indicators: commitment to process; diagnosis is known to respond to functional treatment;     Negative indicators: none      Research: This patient was contacted by email about participation in research. May be eligible for cough study.      TREATMENT PLAN  Speech therapy    DURATION/FREQUENCY OF TREATMENT  3 weekly or bi-weekly one-hour sessions, with 2 monthly one-hour follow-up sessions  GOALS  Patient goal:    To understand the problem and fix it as much as possible  To reduce her cough and throat clearing to acceptable levels    Long-term goal(s): In 6  months, Ms. Love will:  1.  Report a week with no more than two episodes of coughing or throat-clearing per day, that do not last more than two seconds    This treatment plan was developed with the patient who agreed with the recommendations.      TOTAL SERVICE TIME: 50 minutes  EVALUATION OF VOICE AND RESONANCE ([address])  NO CHARGE FACILITY FEE       Dunia Son, Ph.D., [facility]-SLP  Speech-Language Pathologist  Director, [facility]  [phone]

## 2022-06-23 NOTE — PATIENT INSTRUCTIONS
1.  You were seen in the ENT Clinic today by . If you have any questions or concerns after your appointment, please call [phone]. Press option #1 for scheduling related needs. Press option #3 for Nurse advice.    2.   has recommended  the following:   - chest x ray to be done today if possible. We will contact you with results   - voice therapy   - look over LPRD information included below  Laryngopharyngeal Reflux Disorder (LPRD)     Reflux is a term that describes stomach contents that back up above the stomach. The stomach contents range depending on what is in the stomach at the time of this happening from very acidic to non-acidic. When the stomach contents backup happens to the level of the esophagus - this can cause indigestion, stomach upset, pain, heartburn, regurgitation. This is known as Gastroesophageal Reflux Disorder (GERD). When the stomach contents back up even higher to your throat - to the level of the entrance of your esophagus and your voice box or larynx - this is called extraesophageal reflux - atypical reflux or laryngopharyngeal reflux and can cause what is known as Laryngopharyngeal Reflux Disorder (LPRD).      Gastroesophageal Reflux Disorder (GERD) is a well-known problem in this [place], with advertisements for the many medications used to treat it commonly seen on TV.  GERD occurs when stomach acid makes its way back up the esophagus, causing indigestion, stomach upset, and, especially, heartburn.  The acid may travel all the way up the esophagus, and spill over onto the larynx.  This is especially common during sleep, when the individual is lying down, and acid does not have to fight gravity to move up the esophagus.  When symptoms of acid reflux are more apparent in the larynx or pharynx, then the disorder may be called Laryngopharyngeal Reflux Disorder (LPRD).    Some of the symptoms include:     a dry, choking sensation, especially during the night  voice quality  that is worst in the morning  a raw, burning sensation in the throat  pain in the throat, neck, or running from the back of the chin along the neck  frequent coughing, or desire to clear the throat, or mucus sensation  a rough, gritty voice quality  decline in voice quality, or comfort, with continued voice use  a sour taste in your mouth upon waking up  a lump in the throat or globus sensation   postnasal drip, without other nasal and allergic symptoms   sensation of difficulty inhaling - like paradoxical vocal fold motion or laryngospasm      Interestingly, many of the patients in the [facility] who have laryngeal symptoms (LPRD) do not have classic symptoms of GERD, such as stomach discomfort and heartburn.     Why does LPRD cause throat symptoms:   In the case of LPRD, when the stomach content spills over onto the larynx, it irritates the lining of the throat and the vocal folds and creates inflammation, which causes the vocal folds to vibrate unevenly.  Coughing and throat-clearing from the irritation can make the inflammation worse.  The resulting voice disorder is often related to the poor vibratory quality of the inflamed vocal folds and the muscle tension created by effortful attempts at compensation.     Anti-reflux medication and dietary precautions are the first line of treatment for GERD/LPRD.  Discuss medications with your doctor.  Some medications may have side effects if taken for a long time, and it will be important for you to consider these.  Functional voice therapy is useful to teach techniques for reducing effortful compensation and instruct the individual in improved laryngeal hygiene.     At the [facility], we do not hand out a list of foods and beverages that must be avoided.  Rather, we educate about types of foods and beverages known to cause reflux, and we will encourage you to systematically investigate which foods stimulate your own reflux.  See the back of this page for  dietary and lifestyle precautions for GERD/LPRD.  Also, you are encouraged to manage reflux under the care of a gastrointestinal specialist.     Lifestyle changes that may help reduce symptoms of GERD/LPRD  eat smaller meals more frequently throughout the day, rather than three large meals  elevate the head of your bed 2-3 inches (don't just use extra pillows for your head)  avoid clothes that fit tightly around the waist  avoid exercising or lying down within 2-3 hours of eating (don't eat dinner late at night)  Types of foods known to trigger increased stomach acid  spicy food (such as chili or jalapeño peppers, Sudanese or Szechuan spices)  acidic foods such as tomato products or citrus products  greasy foods  caffeine  alcohol  carbonation  roughage, such as popcorn and peanuts, or raw vegetables  dairy products   strong mint such as peppermint candies  chocolate     IMPORTANT NOTE!  Before you read the information in the box and think you'll only be able to eat bread and oatmeal for the rest of your life, remember that reflux varies person to person.  One person may not have problems with coffee but is bothered by red wine, while another person may not be affected by red wine but can't have tomato sauce.  If you choose to restrict with your diet, try changing things one group at a time to see what makes the difference for you.       Regarding the use of anti-reflux medications, one of the most common varieties is called a Proton Pump Inhibitor or PPI.  This class of medication includes brand name drugs like Prilosec and its generic form Omeprazole.  For most PPI to get the best benefit from this type of medication it should be taken on an empty stomach 30-60 minutes before you eat.  As with any medication, speak to your pharmacist. Y if you want to be sure you are taking it correctly, and that it does not interact with any of your home medications - especially antiplatelet medication (like plavix) or thyroid  replacement medication (like synthroid). ( speak to your pharmacist)         ==   List of Food Items for the Low-Acid Diet:  Agave  Aloe vera   Apple  Artifical sweetener (max 2 tsp per day)  Avocado   Bagels and (non-fruit) low-fat muffins  Banana  Beans (black, red, lima, lentils, etc)  Bread (especially whole grain and rye)  Caramel (maximum 4 tablespoons per work)  Celery  Chamomile tea (most other herbal teas are not acceptable)  Chicken (grilled, broiled, baked, or steamed; no skin)  Chicken stock or bouillon Coffee (max 1 cup per day; best with milk)  Egg whites  Fennel  Fish (grilled, broiled, baked, or steamed)  Ginger (ginger root, powdered, or preserved)  Nick crackers  Herbs (excluding all peppers, citrus, garlic, and mustard)  Honey  Melon (honeydew, cantaloupe, watermelon)  Mushrooms (raw or cooked)  Oatmeal (all whole-grain cereals)  Olive oil (maximum 2 tablespoons per day)  Parsley  Pasta (with nonacidic sauce)  Popcorn (plain or salted, no butter)  Potatoes (all of the root vegetables except onions)  Rice (healthy, especially brown rice, a staple during induction)  Skim milk (soy or Lactaid skim milk)  Soups (great homemade with noodles and vegetables)  Tofu  Turkey breast (organic, no skin)  Turnip  Vegetables (raw or cooked, but no onion, tomato, or peppers)  Vinaigrette (maximum 1 tablespoon per day; toss salads)  Whole-grain breads, crackers, and breakfast cereals   - if morning symptoms do not improve,follow up with your primary physician to discuss possible sleep study    3.  Plan is to return to clinic as needed or if your swallow worsens      China Hooper LPN  [phone]  [facility] - Otolaryngology

## 2022-06-23 NOTE — LETTER
2022       RE: Maria A Love  [id number] [address] [place] [id number]     Dear Colleague,    Thank you for referring your patient, Maria A Love, to the [facility]AR [facility] [place] at [facility]. Please see a copy of my visit note below.        [facility]   at the [facility]   Otolaryngology Clinic     Patient: Maria A Love    MRN: [id number]    : 1970    Age/Gender: 52 year old female  Date of Service: 2022  Rendering Provider:   Rhoda Proctor MD     Referring Provider   PCP: Mila Perez  Referring Physician: Pepe Dick MD  [address] [place] [id number]  Reason for Consultation   Chronic cough/throat clearing  Inclusion body myositis  History   HISTORY OF PRESENT ILLNESS: Ms. Love is a 52 year old female who presents to us today for consultation.      She presents today for evaluation. she reports:    Dysphonia  - denies    Dysphagia  - recent diagnosis of IBM    Dyspnea  - prior history of exercise induced asthma   - not anymore    Throat clearing/cough  - 12x in the past 3 days  - feels an itch  - she coughs so hard it is hard to control  - she has throat clearing   - the cough usually comes when she is talking  - last one she was in a pottery class  - she wakes up at night with a very dry mouth     GERD/LPRD   - denies   - had an endoscopy and was placed on prevacid for stomach discomfort    PAST MEDICAL HISTORY: No past medical history on file.    PAST SURGICAL HISTORY:   Past Surgical History:   Procedure Laterality Date     BIOPSY MUSCLE DIAGNOSTIC (LOCATION) Left 2022    Procedure: BIOPSY, MUSCLE LEFT QUADRICEPS;  Surgeon: Jj Calvert MD;  Location: [facility] OR     CURRENT MEDICATIONS:   Current Outpatient Medications:      amLODIPine (NORVASC) 5 MG tablet, amlodipine 5 mg tablet  TAKE 1 TABLET DAILY, Disp: , Rfl:      Calcium  Carbonate (CALCARB 600 PO), , Disp: , Rfl:      cholecalciferol 50 MCG (2000 UT) tablet, Take by mouth daily, Disp: , Rfl:      escitalopram (LEXAPRO) 10 MG tablet, Take 10 mg by mouth daily, Disp: , Rfl:      hydrochlorothiazide (HYDRODIURIL) 12.5 MG tablet, hydrochlorothiazide 12.5 mg tablet  TAKE 1 TABLET DAILY (NEED APPOINTMENT), Disp: , Rfl:      Krill Oil (OMEGA-3) 500 MG CAPS, Take 640 mg by mouth, Disp: , Rfl:      zolpidem (AMBIEN) 5 MG tablet, zolpidem 5 mg tablet  Take 1 tablet as needed by oral route at bedtime., Disp: , Rfl:     ALLERGIES: Amoxicillin    SOCIAL HISTORY:    Social History     Socioeconomic History     Marital status:      Spouse name: Not on file     Number of children: Not on file     Years of education: Not on file     Highest education level: Not on file   Occupational History     Not on file   Tobacco Use     Smoking status: Not on file     Smokeless tobacco: Not on file   Substance and Sexual Activity     Alcohol use: Not on file     Drug use: Not on file     Sexual activity: Not on file   Other Topics Concern     Not on file   Social History Narrative     Not on file     Social Determinants of Health     Financial Resource Strain: Not on file   Food Insecurity: Not on file   Transportation Needs: Not on file   Physical Activity: Not on file   Stress: Not on file   Social Connections: Not on file   Intimate Partner Violence: Not on file   Housing Stability: Not on file       FAMILY HISTORY: No family history on file.  Non-contributory for problems with anesthesia    REVIEW OF SYSTEMS:   The patient was asked a 14 point review of systems regarding constitutional symptoms, eye symptoms, ears, nose, mouth, throat symptoms, cardiovascular symptoms, respiratory symptoms, gastrointestinal symptoms, genitourinary symptoms, musculoskeletal symptoms, integumentary symptoms, neurological symptoms, psychiatric symptoms, endocrine symptoms, hematologic/lymphatic symptoms, and allergic/  immunologic symptoms.   The pertinent factors have been included in the HPI and below.  Patient Supplied Answers to Review of Systems   ENT ROS 6/20/2022   Neurology: Unexplained weakness   Ears, Nose, Throat: Trouble swallowing       Physical Examination   The patient underwent a physical examination as described below. The pertinent positive and negative findings are summarized after the description of the examination.  Constitutional: The patient's developmental and nutritional status was assessed. The patient's voice quality was assessed.  Head and Face: The head and face were inspected for deformities. The sinuses were palpated. The salivary glands were palpated. Facial muscle strength was assessed bilaterally.  Eyes: Extraocular movements and primary gaze alignment were assessed.  Ears, Nose, Mouth and Throat: The ears and nose were examined for deformities. The nasal septum, mucosa, and turbinates were inspected by anterior rhinoscopy. The lips, teeth, and gums were examined for abnormalities. The oral mucosa, tongue, palate, tonsils, lateral and posterior pharynx were inspected for the presence of asymmetry or mucosal lesions.    Neck: The tracheal position was noted, and the neck mass palpated to determine if there were any asymmetries, abnormal neck masses, thyromegally, or thyroid nodules.  Respiratory: The nature of the breathing and chest expansion/symmetry was observed.  Cardiovascular: The patient was examined to determine the presence of any edema or jugular venous distension.  Abdomen: The contour of the abdomen was noted.  Lymphatic: The patient was examined for infraclavicular lymphadenopathy.  Musculoskeletal: The patient was inspected for the presence of skeletal deformities.  Extremities: The extremities were examined for any clubbing or cyanosis.  Skin: The skin was examined for inflammatory or neoplastic conditions.  Neurologic: The patient's orientation, mood, and affect were noted. The  cranial nerve  functions were examined.  Other pertinent positive and negative findings on physical examination:      OC/OP: no lesions, uvula midline, soft palate elevates symmetrically  Neck: no lesions, no TH tenderness to palpation  All other physical examination findings were within normal limits and noncontributory.  Procedures   Flexible laryngoscopy (CPT 75015)      Pre-procedure diagnosis: chronic throat clearing  Post-procedure diagnosis: same as above  Indication for procedure: Ms. Love is a 52 year old female with see above  Procedure(s): Fiberoptic Laryngoscopy    Details of Procedure: After informed consent was obtained, the patient was seated in the examination chair.  The areas of the nasopharynx as well as the hypopharynx were anesthetized with topical 4% lidocaine with 0.25% phenylephrine atomizer.  Examination of the base of tongue was performed first.  Attention was directed to any evidence of masses in the area or evidence of leukoplakia or candidal infection.  Attention was directed to the epiglottis where its size and position was determined and its movement on phonation of the vowel  e .  The piriform sinuses were then inspected for any mass lesions or pooling of secretions.  Attention was then directed to the larynx. The vocal folds were inspected for infection or any areas of leukoplakia, for masses, polypoid degeneration, or hemorrhage.  Having done this, the arytenoids and vocal processes were inspected for erythema or evidence of granuloma formation.  The posterior commissure was then inspected for evidence of inflammatory changes in the mucosa and heaping up of mucosal tissue. The patient was then instructed to say the vowel  e .  Adduction of vocal folds to the midline was observed for any evidence of paresis or paralysis of the larynx or asymmetry in rotation of the larynx to the left or right. The patient was asked to breathe and the degree of abduction was noted bilaterally.   Subglottic view of the larynx was obtained for any additional mass lesions or mucosal changes.  Finally the post cricoid was examined for evidence of pooling of secretions, as well as the pharyngeal wall mucosa.   Anesthesia type: 0.25% phenylephrine    Findings:  Anatomic/physiological deviations: RNC, bilateral septal spurs, mild supraglottic hyperfunction   Right vocal process: No restriction of mobility   Left vocal process: No restriction of mobility  Glottal gap: Complete glottal closure  Supraglottic structures: Normal  Hypopharynx: Normal     Estimated Blood Loss: minimal  Complications: None  Disposition: Patient tolerated the procedure well          Fiberoptic Endoscopic Evaluation of Swallowing (CPT 86973)  and Interpretation of Swallowing (CPT 33634)    Indications: See above notes for complete history and physical. Patient complains of dysphagia to both solids and liquids and/or there is suggestion on history and endoscopic exam of the presence of dysphagia causing medical complaints.  Swallowing evaluation is being performed to assess the presence and degree of dysphagia, and to recommend a safe diet.     Pulmonary Status:  No PNA   Current Diet:              regular                                        thin liquids      Consistency Amounts:  Thin Liquid: sip   Puree: sip  Solid: cookies            Positioning: upright in a chair  Oral Peripheral Exam: See physical exam section.  Anatomic Notes: See Videostroboscopy report for assessment of anatomy and laryngeal functioning  Pharyngeal secretions prior to administration of liquid or food: No  Oral Phase Abnormal Findings: No abnormal behavior observed  Behavioral Adaptations: No abnormal behavior observed  Pharyngeal Phase Abnormal Findings: no penetration, no aspiration    Recommended Diet:  regular                                        thin liquids               Review of Relevant Clinical Data   I personally reviewed:  Notes: Dr. Woodard  "[facility] - Neurology 3/14/22    Labs:  No results found for: TSH  No results found for: NA, CO2, BUN, CREAT, GLUCOSE, PHOS  No results found for: WBC, HGB, HCT, MCV, PLT  No results found for: PT, PTT, INR  No results found for: ABE  No components found for: RHEUMATOIDFACTOR,  RF  No results found for: CRP  No components found for: CKTOT, URICACID  No components found for: C3, C4, DSDNAAB, NDNAABIFA  No results found for: MPOAB    Patient reported Quality of Life (QOL) Measures   Patient Supplied Answers To VHI Questionnaire  Voice Handicap Index (VHI-10) 6/20/2022   My voice makes it difficult for people to hear me 0   People have difficulty understanding me in a noisy room 0   My voice difficulties restrict my personal and social life.  0   I feel left out of conversations because of my voice 0   My voice problem causes me to lose income 0   I feel as though I have to strain to produce voice 0   The clarity of my voice is unpredictable 0   My voice problem upsets me 0   My voice makes me feel handicapped 0   People ask, \"What's wrong with your voice?\" 0   VHI-10 0     Patient Supplied Answers To EAT Questionnaire  Eating Assessment Tool (EAT-10) 6/20/2022   My swallowing problem has caused me to lose weight 1   My swallowing problem interferes with my ability to go out for meals 0   Swallowing liquids takes extra effort 0   Swallowing solids takes extra effort 1   Swallowing pills takes extra effort 1   Swallowing is painful 0   The pleasure of eating is affected by my swallowing 1   When I swallow food sticks in my throat 2   I cough when I eat 1   Swallowing is stressful 1   EAT-10 8     Patient Supplied Answers To CSI Questionnaire  Cough Severity Index (CSI) 6/20/2022   My cough is worse when I lie down 1   My coughing problem causes me to restrict my personal and social life 2   I tend to avoid places because of my cough problem 2   I feel embarrassed because of my coughing problem 2   People ask, ''What's " wrong?'' because I cough a lot 1   I run out of air when I cough 2   My coughing problem affects my voice 2   My coughing problem limits my physical activity 1   My coughing problem upsets me 2   People ask me if I am sick because I cough a lot 1   CSI Score 16     Patient Supplied Answers to Dyspnea Index Questionnaire:  No flowsheet data found.    Impression & Plan     IMPRESSION: Ms. Love is a 52 year old female who is being seen for the followin. Dysphagia  - avoids certain foods   - has a recent diagnosis of IBM  - FEES shows no penetration, aspiration or residue   Plan  - Xray Video Swallow Exam with esophagram in the future if develops swallow symptoms      2. Chronic cough/throat clearing  - started 3 years ago   - has stayed the same overtime  - associated with sensation of tickle when she is talking and gives her a big coughing fit that is significant - about 12x/ in the past 3 years  - she also has throat clearing  - allergy triggers and work up: denies  - pulmonary triggers and work up: had prior exercise induced asthma, no longer has difficulty breathing  - GI triggers and work up: worst in the AM, wakes up at night with dry mouth, feels like she can't swallow  - ENT triggers and work up: feels something in the throat that makes her clear her throat, coughing fits are with talking  - scope shows mild supraglottic hyperfunction, no pooling of saliva   - 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 due to irritable larynx syndrome and muscle tension dysphonia  - AM symptoms could be due to reflux - reflux precautions - if not improved then likely due to dry mouth from snoring overnight   - nighttime symptoms of dry mouth are likely due to snoring from nasal septal spurs     Plan  - CXR PA/lateral  - cough/throat clearing suppression therapy  - reflux precautions  - if morning throat clearing not improved with reflux precautions - consider sleep evaluation        RETURN VISIT: as needed if swallowing worsens      Scribe  Preparation Attestation:  I, Vanda Massey, a scribe, prepared the chart for today's encounter.         Thank you for the kind referral and for allowing me to share in the care of Ms. Love. If you have any questions, please do not hesitate to contact me.    Rhoda Proctor MD    Laryngology    [facility]  Department of  Otolaryngology - Head and Neck Surgery  [facility] [place] [address]  Appointment line: [phone]  Fax: [phone]  [email or web address]

## 2022-06-28 NOTE — PROGRESS NOTES
"[facility] VOICE [facility]  Allen Harper Jr., M.D., F.A.C.S.  Hailey Marvin M.D., M.P.H.  Rhoda Proctor M.D.  Dunia Son, Ph.D., CCC-SLP  Manjinder Cardenas, Ph.D., CCC-SLP  Tash Marin M.M. (voice), M.A., CCC-SLP  Rio Estevez M.M. (voice), M.BRENDAN., CCC-SLP  NAKUL Baez (voice), M.S., CCC-SLP    [facility]  VOICE/SPEECH/BREATHING EVALUATION AND LARYNGEAL EXAMINATION REPORT    Patient: Maria A Love  Date of Visit: 6/23/2022    Clinician: Dunia Son, Ph.D., CCC/SLP  Seen in conjunction with: Dr. Rhoda Proctor  We are also joined by Swallow Specialty SLP Richa Aleman  Referring Physician: Dr. Dick    CHIEF COMPLAINT:  Cough and throat clear, as well as swallowing problems    HISTORY  Maria A Love is a 52 year old presenting today for evaluation of cough and throat clearing; please see Ms. Espinozaon's note regarding the swallowing problem.    Please refer to Dr. Proctor s dictation for a more complete history and impressions.     Salient history: She has a history significant for a recent diagnosis of inclusion body myositis.    COUGH/THROAT CLEARING    Onset/ inciting factors: Unclear    Progression: Unclear    History of intervention:    Current Symptoms:    Sensation of a tickle in the throat that leads to a cough; episodes become extended and violent, and end with epistaxis    Episodes can be triggered by talking    Episodes not triggered by environmental irritants or temperature changes    Rarely an episode is triggered by eating, these are not quite annual, but \"memorable\"    Improves with: Nothing; she drinks a lot of water, but does not think it matters    Also has a frequent phlegmy throat clear, that she believes is familial, and a habit    No actual postnasal drainage, but she notes that she sniffs a lot, and also thinks this is a habit    SWALLOWING    Some early problems with swallowing (food will not go down) that she believes may be related to her new diagnosis of " [facility], and she would like to prevent as much as possible    Please see Ms. Aleman's note        BREATHING    No complaints      OTHER PERTINENT HISTORY    Unknown; please refer to Dr. Proctor's note and elsewhere in this chart    OBJECTIVE FINDINGS  VOICE/ SPEECH/ NON-COMMUNICATIVE LARYNGEAL BEHAVIORS EVALUATION  An evaluation of the voice and cough was accomplished today; salient features are as follows:    Palpation of the laryngeal area shows:    NoTenderness of the thyrohyoid area     Palpation does not elicit desire to throat clear/cough    Cough/ Throat clear:    Not observed    Breathing pattern:    Appears within normal limits and adequate     No overt tension is evident.    Voice quality is characterized by    Very frequent glottal alvarez with an underlying normal voice quality; within cultural normal limits    Habitual pitch is within normal limits, in the range from approximately F3 to G3    Loudness is WNL and appropriate for the setting    Sustained vowels: All within normal limits    A singing task shows voice quality is consistent between speech and singing; WNL    In a pitch glide task to determine pitch range, she demonstrates normal pitch range    LARYNGEAL EXAMINATION    Endoscopic laryngeal examination was performed by:  Dr. Proctor  I provided the protocol of instructions for the patient.  Type of exam:   Flexible endoscopy with chip-tip technology    This exam shows:  Laryngeal and Vocal Fold Mucosa    Essentially healthy laryngeal mucosa    Presence of secretions is unremarkable; there does not appear to be a secretion management problem    Status of vocal fold mucosa:   o Within normal limits, with no lesions and straight vibratory margins    Neurological and Functional Integrity of the Larynx    Vocal folds are mobile and meet at midline; movement is brisk and symmetric; exam is neurologically normal     Normal function is evident during a task of 20 quickly repeated vowels    Elongation of the vocal  folds for pitch increase is within normal limits    On phonation, glottic closure is not pressed, and often there is a mild anterior gap that increases with talking, resulting in mildly weak glottic closure    Demonstration of a cough is normal; demonstration of a throat clear shows an aggressive throat clear with high impact to the mucosa    Supraglottic Function and Therapy Probes    Variable mild to moderate anterior-posterior constriction of the supraglottic larynx during connected speech    Variable mild to moderate ventricular approximation during phonation;     Supraglottic function during singing is improved    Stroboscopy was not warranted.  FEES was accomplished by Dr. Proctor and Ms. Aleman, showing a strong swallow currently.    Dr. Proctor and I reviewed this laryngeal exam with Ms. Love today, and I provided pertinent explanations:    Endoscopic findings are consistent with audio-perceptual assessment and patient Hx/complaints.    her symptoms are accounted for by a variety of mild findings, including probable mucosal irritation    Her glottal alvarez may be accounted for by the occasional weak and irregular glottic closure    One of her mucosal irritations may be due to her mild to moderate supraglottic constriction    The aggressive throat clear is most likely a cause of mucosal irritation    Because there appears to be a functional component to her symptoms, she would most likely benefit from functional speech therapy.    ASSESSMENT / PLAN  IMPRESSIONS:  This evaluation has resulted in the following diagnosis/diagnoses for Ms. Love  Chronic Cough (R05.3)  Chronic Throat Clearing (R68.89)  Irritable Larynx Syndrome (ILS) (J38.7).      Laryngeal evaluation demonstrated a small and variable anterior glottic gap that may account for frequent glottal alvarez; aggressive throat clearing that may be a source of mucosal irritation; mild to moderate supraglottic constriction that may be a source of laryngeal  irritation    Perceptual evaluation demonstrated frequent glottal alvarez cough and throat clear were not observed in today's; visit  RECOMMENDATIONS:     A course of speech therapy is recommended to optimize vocal technique and help reduce chronic cough, throat clear and mucosal irritation.    She demonstrates a Good prognosis for improvement given adherence to therapeutic recommendations. Therapeutic     Positive indicators: commitment to process; diagnosis is known to respond to functional treatment;     Negative indicators: none      Research: This patient was contacted by email about participation in research. May be eligible for cough study.      TREATMENT PLAN  Speech therapy    DURATION/FREQUENCY OF TREATMENT  3 weekly or bi-weekly one-hour sessions, with 2 monthly one-hour follow-up sessions  GOALS  Patient goal:    To understand the problem and fix it as much as possible  To reduce her cough and throat clearing to acceptable levels    Long-term goal(s): In 6  months, Ms. Love will:  1.  Report a week with no more than two episodes of coughing or throat-clearing per day, that do not last more than two seconds    This treatment plan was developed with the patient who agreed with the recommendations.      TOTAL SERVICE TIME: 50 minutes  EVALUATION OF VOICE AND RESONANCE (95745)  NO CHARGE FACILITY FEE       Dunia Son, Ph.D., [facility]-SLP  Speech-Language Pathologist  Director, [facility]  [phone]

## 2022-09-06 NOTE — ADDENDUM NOTE
Encounter addended by: Vidhya Woodruff, OT on: 9/6/2022 7:51 AM   Actions taken: Episode resolved, Clinical Note Signed, Flowsheet accepted

## 2022-09-06 NOTE — PROGRESS NOTES
[facility]    Outpatient Occupational Therapy Discharge Note  Patient: Maria A Love  : 1970    Beginning/End Dates of Reporting Period:  22    Referring Provider: Dr. Pepe Dick    Therapy Diagnosis: bilateral hand weakness, decreased ADL and IADL    Goals: Unable to assess as patient did not return for follow up    Plan:  Discharge from therapy.

## 2022-09-12 ENCOUNTER — VIRTUAL VISIT (OUTPATIENT)
Dept: OTOLARYNGOLOGY | Facility: CLINIC | Age: 52
End: 2022-09-12
Payer: COMMERCIAL

## 2022-09-12 DIAGNOSIS — J38.7 IRRITABLE LARYNX SYNDROME: ICD-10-CM

## 2022-09-12 DIAGNOSIS — R09.89 THROAT CLEARING: ICD-10-CM

## 2022-09-12 DIAGNOSIS — R05.9 COUGH: Primary | ICD-10-CM

## 2022-09-12 PROCEDURE — 92507 TX SP LANG VOICE COMM INDIV: CPT | Mod: GN | Performed by: SPEECH-LANGUAGE PATHOLOGIST

## 2022-09-12 NOTE — LETTER
9/12/2022       RE: Maria A Love  [id number] [address] [place] [id number]     Dear Colleague,    Thank you for referring your patient, Maria A Love, to the [facility] VOICE CLINIC [place] at [facility]. Please see a copy of my visit note below.    Maria A Love is a 52 year old female who is being evaluated via a billable video visit.      Maria A has been notified and verbally consented to the following:     This video visit will be conducted between you and your provider.    Patient has opted to conduct today's video visit vs an in-person appointment.     Video visits are billed at different rates depending on your insurance coverage. Please reach out to your insurance provider with any questions.     If during the course of the call the provider feels the appointment is not appropriate, you will not be charged for this service.  Provider has received verbal consent for billable virtual visit from the patient? Yes  Will anyone else be joining your video visit? No    Call initiated at: 3:31 PM   Type of Visit Platform Used: [employer]  Location of provider: Home  Location of patient: [facility]  Allen Harper Jr., M.D., F.A.C.S.  Hailey Marvin M.D., M.P.H.  Rhoda Proctor M.D.  Dunia Son, Ph.D., CCC-SLP  Manjinder Cardenas, Ph.D., CCC-SLP  Tash Marin M.M. (voice), M.A., CCC-SLP  Rio Estevez M.M. (voice), M.A., CCC-SLP  NAKUL Baez (voice), M.S., CCC-SLP    [facility]  VOICE/SPEECH/BREATHING THERAPY PROGRESS REPORT    Patient: Maria A Love  Date of Service: 9/12/2022    Date of Last Service: 6/23/22  Referring physician: Dr. Proctor  Initial evaluation: 6/23/22    I had the pleasure of seeing Ms. Love today, for speech therapy to address a diagnosis of:  Chronic Cough (R05.3)   Chronic Throat Clearing (R68.89)  Irritable Larynx Syndrome (ILS) (J38.7).    PROGRESS SINCE LAST SESSION  Ms. Love  "was seen for evaluation on 6/23/22.  At that time, it was determined that she would benefit from a course of speech therapy to address the above diagnosis.  No therapeutic suggestions were made at the time.    Ms. Love states that:    She hasn't had any of the \"violent\" cough episodes; they were rare to begin with     Her throat-clearing is better, because she is trying to be mindful about avoiding the cough or throat-clear    Ms. Love presents today with the following:  Voice quality:    WNL  Cough/ Throat clear:    None observed today    THERAPEUTIC ACTIVITIES  Today Ms. Love participated in the following therapeutic activities:    [place] concepts and techniques for using saline and plain-water gargling, and guaifenesin to reduce the thickened secretions / laryngeal irritation.    [place] concepts and techniques for improving topical and systemic hydration     I provided instruction for techniques and strategies to reduce the chronic cough/throat clearing  o alternative behaviors such as voiceless glottic coup, humming, swallowing, etc. were taught  o strategies for reducing mucosal irritation were taught  o I provided a handout and we went through it together    [place] techniques for semioccluded vocal tract exercises in order to promote laryngeal relaxation  o The puffed lip configuration did provide a sense of forward focus and improved laryngeal relaxation during phonation for her  o I provided instructions on a regimen for practice of these exercises    IMPRESSIONS/GOALS/PLAN  Ms. Love had a productive session of speech therapy today, to address the following:  Chronic Cough (R05.3)   Chronic Throat Clearing (R68.89)   Irritable Larynx Syndrome (ILS) (J38.7)  Speech therapy for her is medically necessary to allow  her to meet personal and professional demands and fully engage in activities of daily living.     She will continue to work on her exercises on a daily basis, and work on incorporating the " techniques into her daily activities.    Goals for this practice period:     practice all exercises, strategies, and techniques according to instructions    incorporate techniques into daily activities    maintain vigilance for cough and throat clear    Plan: I will see Ms. Love in 2 weeks to work on education, modification, and carryover of therapeutic activities to more complex activities.    TOTAL SERVICE TIME: 51 minutes  TREATMENT ([id number])  NO CHARGE FACILITY FEE    Dunia Son, Ph.D., [facility]-SLP  Speech-Language Pathologist  Director, [facility]  [phone]

## 2022-09-12 NOTE — PROGRESS NOTES
"Maria A Love is a 52 year old female who is being evaluated via a billable video visit.      Maria A has been notified and verbally consented to the following:     This video visit will be conducted between you and your provider.    Patient has opted to conduct today's video visit vs an in-person appointment.     Video visits are billed at different rates depending on your insurance coverage. Please reach out to your insurance provider with any questions.     If during the course of the call the provider feels the appointment is not appropriate, you will not be charged for this service.  Provider has received verbal consent for billable virtual visit from the patient? Yes  Will anyone else be joining your video visit? No    Call initiated at: 3:31 PM   Type of Visit Platform Used: [employer]  Location of provider: Home  Location of patient: [facility]  Allen Harper Jr., M.D., F.A.C.S.  Hailey Marvin M.D., M.P.H.  Rhoda Proctor M.D.  Dunia Son, Ph.D., CCC-SLP  Manjinder Cardenas, Ph.D., [facility]-SLP  Tash Marin M.M. (voice), M.A., CCC-SLP  Rio Estevez M.M. (voice), M.A., CCC-SLP  NAKUL Baez (voice), M.S., [facility]  VOICE/SPEECH/BREATHING THERAPY PROGRESS REPORT    Patient: Maria A Love  Date of Service: 9/12/2022    Date of Last Service: 6/23/22  Referring physician: Dr. Proctor  Initial evaluation: 6/23/22    I had the pleasure of seeing Ms. Love today, for speech therapy to address a diagnosis of:  Chronic Cough (R05.3)   Chronic Throat Clearing (R68.89)  Irritable Larynx Syndrome (ILS) (J38.7).    PROGRESS SINCE LAST SESSION  Ms. Love was seen for evaluation on 6/23/22.  At that time, it was determined that she would benefit from a course of speech therapy to address the above diagnosis.  No therapeutic suggestions were made at the time.    Ms. Love states that:    She hasn't had any of the \"violent\" cough episodes; they were rare to begin " with     Her throat-clearing is better, because she is trying to be mindful about avoiding the cough or throat-clear    Ms. Love presents today with the following:  Voice quality:    WNL  Cough/ Throat clear:    None observed today    THERAPEUTIC ACTIVITIES  Today Ms. Love participated in the following therapeutic activities:    [place] concepts and techniques for using saline and plain-water gargling, and guaifenesin to reduce the thickened secretions / laryngeal irritation.    [place] concepts and techniques for improving topical and systemic hydration     I provided instruction for techniques and strategies to reduce the chronic cough/throat clearing  o alternative behaviors such as voiceless glottic coup, humming, swallowing, etc. were taught  o strategies for reducing mucosal irritation were taught  o I provided a handout and we went through it together    [place] techniques for semioccluded vocal tract exercises in order to promote laryngeal relaxation  o The puffed lip configuration did provide a sense of forward focus and improved laryngeal relaxation during phonation for her  o I provided instructions on a regimen for practice of these exercises    IMPRESSIONS/GOALS/PLAN  Ms. Love had a productive session of speech therapy today, to address the following:  Chronic Cough (R05.3)   Chronic Throat Clearing (R68.89)   Irritable Larynx Syndrome (ILS) (J38.7)  Speech therapy for her is medically necessary to allow  her to meet personal and professional demands and fully engage in activities of daily living.     She will continue to work on her exercises on a daily basis, and work on incorporating the techniques into her daily activities.    Goals for this practice period:     practice all exercises, strategies, and techniques according to instructions    incorporate techniques into daily activities    maintain vigilance for cough and throat clear    Plan: I will see Ms. Love in 2 weeks to work on  education, modification, and carryover of therapeutic activities to more complex activities.    TOTAL SERVICE TIME: 51 minutes  TREATMENT (33939)  NO CHARGE FACILITY FEE    Dunia Son, Ph.D., [facility]-SLP  Speech-Language Pathologist  Director, [facility]  [phone]

## 2022-09-22 ENCOUNTER — TELEPHONE (OUTPATIENT)
Dept: OTOLARYNGOLOGY | Facility: CLINIC | Age: 52
End: 2022-09-22

## 2022-09-22 NOTE — TELEPHONE ENCOUNTER
LVM that NATALIE bosch  is no longer available on 10/24    and we need to reschedule their appt. Gave call center number

## 2022-10-03 ENCOUNTER — HEALTH MAINTENANCE LETTER (OUTPATIENT)
Age: 52
End: 2022-10-03

## 2022-11-29 ENCOUNTER — OFFICE VISIT (OUTPATIENT)
Dept: NEUROLOGY | Facility: CLINIC | Age: 52
End: 2022-11-29
Payer: COMMERCIAL

## 2022-11-29 DIAGNOSIS — G72.41 IBM (INCLUSION BODY MYOSITIS) (H): Primary | ICD-10-CM

## 2022-11-29 NOTE — LETTER
2022       RE: Maria A Love  [address] [address] [place] [address]     Dear Colleague,    Thank you for referring your patient, Maria A Love, to the [facility] EMG CLINIC [place] at [facility]. Please see a copy of my visit note below.    [facility] PHYSICIANS   NEUROMUSCULAR PATHOLOGY REPORT     NEUROMUSCULAR LABORATORY [phone] / [phone]  [address] [place] [address],  [place] [address]     MUSCLE BIOPSY LIGHT MICROSCOPY REPORT  NAME: Maria A Love  : 1970  MR#: [id number]  DATE OF BIOPSY: 2022  DATE OF REPORT: 2022  SPECIMEN NO:   SURGEON: Enrike  REFERRING PHYSICIAN: Mayelin    CLINICAL INFORMATION:    This 52 year-old woman had a muscle biopsy performed to investigate the possibility of having inclusion body myositis.    LEFT VASTIS LATRELIS MUSCLE BIOPSY:    Two pieces of muscle were quick frozen for light microscopy and histochemistry. Another piece of muscle was stretched and fixed in formalin for paraffin-embedding. Muscle was also fixed in 4:1 EM fixative for plastic-embedding. MAB stained sections were reviewed and an appropriate area selected for ultrastructural study. Additional pieces were quick frozen for biochemical testing.    LIGHT MICROSCOPY:    Frozen sections stained with H&E, trichrome, and Congo red, paraffin-embedded sections stained with H&E, Congo red and PAS, and plastic-embedded sections stained with MAB were available for review. There is increased fiber-size variation due to the presence of many atrophic and occasional hypertrophic fibers. There was significant endomysial inflammation with lymphocytes in some instances appearing to attack non-necrotic muscle fibers. There were occasional necrotic fibers as well as degenerating fibers, some of which had rimmed vacuoles. Congo red staining revealed Congo red inclusions within some muscle fibers. There were  occasional ragged red fibers on the trichrome stain. There was increased endomysial and perimysial fibrosis.    HISTOCHEMISTRY:    Frozen sections stained with ATPase (pH 4.35, 4.5 and 9.4), metachromatic ATPase, NADH, SDH, modified SDH, DUMONT, ?-GP, acid phosphatase, and nonspecific esterase were available for review. ATPase staining identified fibers of types 1, 2a, and 2b. There was a normal fiber-type distribution. There was no evidence of fiber-type grouping. Atrophic fibers of all fiber types were seen. Oxidative enzyme stain deposition was irregular in scattered fibers. There were occasional ragged blue fibers on the modified SDH stains, some of which were DUMONT deficient in neighboring sections. Acid phosphatase activity highlighted the presence of inflammatory cells as well as being increased in degenerating fibers. Esterase staining identified endplates and atrophic fibers.    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 The rimmed vacuoles, and especially the presence of congophilic intracellular inclusions within muscle fibers, provides evidence of this being inclusion body myositis. The presence of ragged red, ragged blue, and DUMONT deficient fibers provides evidence of a mitochondriopathy, which is typically present in inclusion body myositis.    Edilberto Aldrich MD        Sincerely,    Pepe Dick MD

## 2022-11-29 NOTE — PROGRESS NOTES
[facility] PHYSICIANS   NEUROMUSCULAR PATHOLOGY REPORT     NEUROMUSCULAR LABORATORY [phone] / [phone]  [address] [place] [address],  [place] [address]     MUSCLE BIOPSY LIGHT MICROSCOPY REPORT  NAME: Maria A Love  : 1970  MR#: [id number]  DATE OF BIOPSY: 2022  DATE OF REPORT: 2022  SPECIMEN NO:   SURGEON: Enrike  REFERRING PHYSICIAN: Mayelin    CLINICAL INFORMATION:    This 52 year-old woman had a muscle biopsy performed to investigate the possibility of having inclusion body myositis.    LEFT VASTIS LATRELIS MUSCLE BIOPSY:    Two pieces of muscle were quick frozen for light microscopy and histochemistry. Another piece of muscle was stretched and fixed in formalin for paraffin-embedding. Muscle was also fixed in 4:1 EM fixative for plastic-embedding. MAB stained sections were reviewed and an appropriate area selected for ultrastructural study. Additional pieces were quick frozen for biochemical testing.    LIGHT MICROSCOPY:    Frozen sections stained with H&E, trichrome, and Congo red, paraffin-embedded sections stained with H&E, Congo red and PAS, and plastic-embedded sections stained with MAB were available for review. There is increased fiber-size variation due to the presence of many atrophic and occasional hypertrophic fibers. There was significant endomysial inflammation with lymphocytes in some instances appearing to attack non-necrotic muscle fibers. There were occasional necrotic fibers as well as degenerating fibers, some of which had rimmed vacuoles. Congo red staining revealed Congo red inclusions within some muscle fibers. There were occasional ragged red fibers on the trichrome stain. There was increased endomysial and perimysial fibrosis.    HISTOCHEMISTRY:    Frozen sections stained with ATPase (pH 4.35, 4.5 and 9.4), metachromatic ATPase, NADH, SDH, modified SDH, DUMONT, ?-GP, acid phosphatase, and nonspecific esterase were available for review.  ATPase staining identified fibers of types 1, 2a, and 2b. There was a normal fiber-type distribution. There was no evidence of fiber-type grouping. Atrophic fibers of all fiber types were seen. Oxidative enzyme stain deposition was irregular in scattered fibers. There were occasional ragged blue fibers on the modified SDH stains, some of which were DUMONT deficient in neighboring sections. Acid phosphatase activity highlighted the presence of inflammatory cells as well as being increased in degenerating fibers. Esterase staining identified endplates and atrophic fibers.    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 The rimmed vacuoles, and especially the presence of congophilic intracellular inclusions within muscle fibers, provides evidence of this being inclusion body myositis. The presence of ragged red, ragged blue, and DUMONT deficient fibers provides evidence of a mitochondriopathy, which is typically present in inclusion body myositis.    Edilberto Aldrich MD

## 2022-12-15 NOTE — ADDENDUM NOTE
Encounter addended by: Kasandra Dolan, PT on: 12/15/2022 10:38 AM   Actions taken: Clinical Note Signed, Episode resolved

## 2022-12-15 NOTE — PROGRESS NOTES
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   Target Date 22   Date Met      Progress (detail required for progress note):  Not Met     Goal Identifier APTA STS   Goal Description Patient will improve APTA STS to 14 repetitions in 30 seconds to demonstrate improved functional strength.   Target Date 22   Date Met      Progress (detail required for progress note):  Not Met     Goal Identifier Stairs   Goal Description Patient will be able to maneuver stairs in her home without difficulty to demonstrate improved function.   Target Date 22   Date Met      Progress (detail required for progress note):  Not Met     Goal Identifier Yoga   Goal Description Patient will be able to participate in yoga 1x/week without pain in her R great toe.   Target Date 22   Date Met      Progress (detail required for progress note): Not Met      Plan:  Discharge from therapy.    Discharge:    Reason for Discharge: Patient chooses to discontinue therapy.  Patient did not schedule follow up appointment nor return after initial evaluation.  She is appropriate for discharge at this time.    Equipment Issued: None    Discharge Plan: Patient to continue home program.    Kasandra Dolan, PT, DPT, MHA  12/15/2022

## 2023-05-20 ENCOUNTER — HEALTH MAINTENANCE LETTER (OUTPATIENT)
Age: 53
End: 2023-05-20

## 2024-03-04 ENCOUNTER — OFFICE VISIT (OUTPATIENT)
Dept: NEUROLOGY | Facility: CLINIC | Age: 54
End: 2024-03-04
Payer: COMMERCIAL

## 2024-03-04 ENCOUNTER — LAB (OUTPATIENT)
Dept: LAB | Facility: CLINIC | Age: 54
End: 2024-03-04
Payer: COMMERCIAL

## 2024-03-04 VITALS
BODY MASS INDEX: 21.22 KG/M2 | DIASTOLIC BLOOD PRESSURE: 84 MMHG | SYSTOLIC BLOOD PRESSURE: 132 MMHG | WEIGHT: 151.6 LBS | HEART RATE: 72 BPM | OXYGEN SATURATION: 99 % | HEIGHT: 71 IN

## 2024-03-04 DIAGNOSIS — M35.00 SICCA SYNDROME (H): ICD-10-CM

## 2024-03-04 DIAGNOSIS — M35.00 SICCA SYNDROME (H): Primary | ICD-10-CM

## 2024-03-04 LAB — RHEUMATOID FACT SERPL-ACNC: <10 IU/ML

## 2024-03-04 PROCEDURE — G2211 COMPLEX E/M VISIT ADD ON: HCPCS | Performed by: PSYCHIATRY & NEUROLOGY

## 2024-03-04 PROCEDURE — 86235 NUCLEAR ANTIGEN ANTIBODY: CPT | Performed by: PSYCHIATRY & NEUROLOGY

## 2024-03-04 PROCEDURE — 86038 ANTINUCLEAR ANTIBODIES: CPT | Performed by: PSYCHIATRY & NEUROLOGY

## 2024-03-04 PROCEDURE — 99000 SPECIMEN HANDLING OFFICE-LAB: CPT | Performed by: PATHOLOGY

## 2024-03-04 PROCEDURE — 36415 COLL VENOUS BLD VENIPUNCTURE: CPT | Performed by: PATHOLOGY

## 2024-03-04 PROCEDURE — 86431 RHEUMATOID FACTOR QUANT: CPT | Performed by: PSYCHIATRY & NEUROLOGY

## 2024-03-04 PROCEDURE — 99214 OFFICE O/P EST MOD 30 MIN: CPT | Performed by: PSYCHIATRY & NEUROLOGY

## 2024-03-04 ASSESSMENT — PAIN SCALES - GENERAL: PAINLEVEL: NO PAIN (0)

## 2024-03-04 NOTE — PATIENT INSTRUCTIONS
Blood tests today to rule out Sjogren syndrome, and Rheumatology referral  Follow up 1 year  I will update you on the results of the current IBM trials when available. Also, please let me know if you feel you need to see PT/OT again.

## 2024-03-04 NOTE — NURSING NOTE
Chief Complaint   Patient presents with    RECHECK     Muscular dystrophy      Vitals were taken and medications were reconciled.   Gurpreet Cao, EMT  2:41 PM

## 2024-03-04 NOTE — PROGRESS NOTES
Inclusion Body Myositis-Functional Rating Scale (IBM-FRS)  1. Swallowing  - 4 Normal   - 3 Early eating problems-occasional choking   - 2 Dietary consistency changes   - 1 Frequent choking   - 0 Needs tube feeding  2. Handwriting (with dominant hand prior to IBM onset)   - 4 Normal   - 3 Slow or sloppy; all words are legible   - 2 Not all words are legible   - 1 Able to  pen but unable to write   - 0 unable to  pen  3. Cutting food and handling utensils   - 4 Normal 3.5  - 3 Somewhat slow and clumsy, but no help needed   - 2 Can cut most foods, although clumsy and slow; some help needed   - 1 Food must be cut by someone, but can still feed slowly   - 0 Needs to be fed  4. Fine motor tasks (opening doors, using keys, picking up small objects)   - 4 Independent   - 3 Slow or clumsy in completing task   - 2 Independent but requires modified techniques or assistive devices   - 1 Frequently requires assistance from caregiver   - 0 Unable  5. Dressing   - 4 Normal   - 3 Independent but with increased effort or decreased efficiency   - 2 Independent but requires assistive devices or modified techniques (Velcro snaps, shirts without buttons, etc)   - 1 Requires assistance from caregiver for some clothing items   - 0 total dependence  6. Hygiene (bathing and toileting)   - 4 Normal 3.5  - 3 Independent but with increased effort or decreased activity   - 2 Independent but requires use of assistive devices (shower chair, raised toilet seat, etc)   - 1 Requires occasional assistance from caregiver   - 0 Completely dependent  7. Turning in bed and adjusting covers   - 4 Normal   - 3 Somewhat slow and clumsy but no help needed   - 2 Can turn alone or adjust sheets, but with great difficulty   - 1 Can initiate, but not turn or adjust sheets alone   - 0 Unable or requires total assistance  8. Sit to stand   - 4 Independent (without use of arms)   - 3 Performs with substitute motions (leaning forward, rocking) but without  use of arms   - 2 Requires use of arms   - 1 requires assistance from a device or person   - 0 Unable to stand  9. Walking   - 4 Normal   - 3 Slow or mild unsteadiness   - 2 Intermittent use of an assistive device (ankle-foot orthosis, cane, walker)   - 1 Dependent on assistive device   - 0 Wheelchair dependent  10. Climbing stairs   - 4 Normal   - 3 Slow with hesitation or increased effort; uses hand rail intermittently   - 2 Dependent on hand rail   - 1 Dependent on hand rail and additional support (cane or person)   - 0 Cannot climb stairs  The maximum score is 40, and the higher the score the better the functional status of the patient. The IBM-FRS addresses swallowing, handwriting, cutting food, handling utensils, dressing, hygiene, turning in bed, adjusting covers, sit to stand, walking and climbing stairs. The IBM-FRS correlates well with isometric strength and manual muscle testing, and we believe it should be utilised as an end point measurement in future IBM trials.  Source; Inclusion body myositis: old and new concepts  doi:10.1136/jnnp.2009.393958  J. Neurol. Neurosurg. Psychiatry 2009;80;7037-8179  BRENDAN Madrigal and NIDIA Reilly  .    TOTAL SCORE: 37      Mila Perez MD  [place], March 4, 2024    Dear Dr. Perez,    I had the pleasure to see Maria A in follow-up at the [facility]/neuromuscular clinic for her inclusion body myositis. Interestingly, she has a family history of the same disease, and her mother is my patient as well.  She has a characteristic HLA-DR 3 antigen on class II typing, that increases the risk of familial inclusion body myositis, which is a very unusual situation, as inclusion body myositis is typically a sporadic disorder.  Of note, her mother underwent genetic testing for a number of the known hereditary inclusion body myopathies, including VCP, MATR3, HNRPNA2, HNRNPB1, etc, and it was negative. Maria A's muscle biopsy done in June 2022 showed endomysial  inflammatory cells (mononuclear) attacking nonnecrotic fibers, and there were also  rimmed vacuoles, and a number of mitochondrial abnormalities with SDH positive and DUMONT negative fibers, as well as positive congophilic deposits in few fibers (amyloid).    Compared to the last time I saw her in clinic 2 years ago, Maria A acknowledges some progression of her proximal leg weakness, and increasing difficulty getting up from low seated chairs.  She has fallen 2-3 times in the entire year.  She does not use any cane or walker.  Regarding her upper extremities, she reports that she is capable of performing all tasks independently, including cutting food, brushing her teeth, combing hair, writing, typing, or doing other fine hand movements, but she is perhaps a little slower than she used to be.  Dysphagia occurs rarely, less than once per week, and does not seem to bother her.  She denies dyspnea on exertion, orthopnea, nonrefreshing sleep, or morning headaches.    Of note, last year she was diagnosed with left bundle branch block.  I told her that this is not likely to be related to inclusion body myositis, as cardiac complications are exceedingly rare in this disorder.  She had a thorough cardiology workup, including stress echo and cardiac MRI, which fortunately did not disclose any concerning findings.  She also was experiencing quite severe back pain a few months ago, during a trip to [place], to the point she could not stand.  Fortunately, this pain has improved now after conservative treatment, including PT, and judicious use of painkillers.  She never had typical sciatica; the pain was limited to the middle of the back. It is better now.     Maria A also tells me that she has prominently dry eyes and dry mouth.  She wonders if she has coexistent Sjogren's syndrome.  She denies upper extremity arthralgias or Raynaud's syndrome.    IBM-FRS score is 37, obtained from the patient today.  See above for details    BP  "132/84 (BP Location: Left arm, Patient Position: Sitting, Cuff Size: Adult Regular)   Pulse 72   Ht 1.792 m (5' 10.55\")   Wt 68.8 kg (151 lb 9.6 oz)   SpO2 99%   BMI 21.41 kg/m      Neuro exam was not repeated.     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  She is not interested in seeing physical or occupational therapy again this year, but she will let me know if she feels that this need arises.  She has taken pottery classes, which she feels have improved her hand skills a bit.  She has no respiratory concerns, and dysphagia is minimal; it will be monitored.    We discussed research developments in [facility], including the true currently recruiting trials [id number] (one of the sites is [place], [place]) and sirolimus (not currently in [place]).  She does not feel that she has the energy to travel to [place] for the  trial at [place].  I will update her with the trial results when available, although both are not anticipated earlier than 2025.    I will check serum ABE, rheumatoid factor, SSA, and SSB antibodies, and I will place a Rheumatology referral.  I told her that even if those antibodies are negative, Sjogren's cannot be entirely ruled out, but I would like the opinion of her rheumatologist in that case before proceeding with more invasive diagnostic test such as lip biopsy.    Follow-up in 1 year, sooner if needed.    Sincerely,      Pepe Dick MD, FAAN    Total time spent on this encounter today 30 minutes of which 15 face-to-face, 10 in postvisit note dictation, editing, and orders, and 5 in previsit chart review.    The longitudinal plan of care for the diagnosis(es)/condition(s) as documented were addressed during this visit. Due to the added complexity in care, I will continue to support Maria A in the subsequent management and with ongoing continuity of care: " [employer]

## 2024-03-04 NOTE — LETTER
3/4/2024       RE: Maria A Love  [id number] [address] [place] [id number]     Dear Colleague,    Thank you for referring your patient, Maria A Love, to the [facility] [place] [facility]. Please see a copy of my visit note below.    Inclusion Body Myositis-Functional Rating Scale (IBM-FRS)  1. Swallowing  - 4 Normal   - 3 Early eating problems-occasional choking   - 2 Dietary consistency changes   - 1 Frequent choking   - 0 Needs tube feeding  2. Handwriting (with dominant hand prior to IBM onset)   - 4 Normal   - 3 Slow or sloppy; all words are legible   - 2 Not all words are legible   - 1 Able to  pen but unable to write   - 0 unable to  pen  3. Cutting food and handling utensils   - 4 Normal 3.5  - 3 Somewhat slow and clumsy, but no help needed   - 2 Can cut most foods, although clumsy and slow; some help needed   - 1 Food must be cut by someone, but can still feed slowly   - 0 Needs to be fed  4. Fine motor tasks (opening doors, using keys, picking up small objects)   - 4 Independent   - 3 Slow or clumsy in completing task   - 2 Independent but requires modified techniques or assistive devices   - 1 Frequently requires assistance from caregiver   - 0 Unable  5. Dressing   - 4 Normal   - 3 Independent but with increased effort or decreased efficiency   - 2 Independent but requires assistive devices or modified techniques (Velcro snaps, shirts without buttons, etc)   - 1 Requires assistance from caregiver for some clothing items   - 0 total dependence  6. Hygiene (bathing and toileting)   - 4 Normal 3.5  - 3 Independent but with increased effort or decreased activity   - 2 Independent but requires use of assistive devices (shower chair, raised toilet seat, etc)   - 1 Requires occasional assistance from caregiver   - 0 Completely dependent  7. Turning in bed and adjusting covers   - 4 Normal   - 3 Somewhat slow and clumsy  but no help needed   - 2 Can turn alone or adjust sheets, but with great difficulty   - 1 Can initiate, but not turn or adjust sheets alone   - 0 Unable or requires total assistance  8. Sit to stand   - 4 Independent (without use of arms)   - 3 Performs with substitute motions (leaning forward, rocking) but without use of arms   - 2 Requires use of arms   - 1 requires assistance from a device or person   - 0 Unable to stand  9. Walking   - 4 Normal   - 3 Slow or mild unsteadiness   - 2 Intermittent use of an assistive device (ankle-foot orthosis, cane, walker)   - 1 Dependent on assistive device   - 0 Wheelchair dependent  10. Climbing stairs   - 4 Normal   - 3 Slow with hesitation or increased effort; uses hand rail intermittently   - 2 Dependent on hand rail   - 1 Dependent on hand rail and additional support (cane or person)   - 0 Cannot climb stairs  The maximum score is 40, and the higher the score the better the functional status of the patient. The IBM-FRS addresses swallowing, handwriting, cutting food, handling utensils, dressing, hygiene, turning in bed, adjusting covers, sit to stand, walking and climbing stairs. The IBM-FRS correlates well with isometric strength and manual muscle testing, and we believe it should be utilised as an end point measurement in future IBM trials.  Source; Inclusion body myositis: old and new concepts  doi:10.1136/jnnp.2009.551402  J. Neurol. Neurosurg. Psychiatry 2009;80;9453-7564  BRENDAN Madrigal and NIDIA Reilly  .    TOTAL SCORE: 37      Mila Perez MD  [place], March 4, 2024    Dear Dr. Perez,    I had the pleasure to see Maria A in follow-up at the [facility]/neuromuscular clinic for her inclusion body myositis. Interestingly, she has a family history of the same disease, and her mother is my patient as well.  She has a characteristic HLA-DR 3 antigen on class II typing, that increases the risk of familial inclusion body myositis, which is a very unusual  situation, as inclusion body myositis is typically a sporadic disorder.  Of note, her mother underwent genetic testing for a number of the known hereditary inclusion body myopathies, including VCP, MATR3, HNRPNA2, HNRNPB1, etc, and it was negative. Maria A's muscle biopsy done in June 2022 showed endomysial inflammatory cells (mononuclear) attacking nonnecrotic fibers, and there were also  rimmed vacuoles, and a number of mitochondrial abnormalities with SDH positive and DUMONT negative fibers, as well as positive congophilic deposits in few fibers (amyloid).    Compared to the last time I saw her in clinic 2 years ago, Maria A acknowledges some progression of her proximal leg weakness, and increasing difficulty getting up from low seated chairs.  She has fallen 2-3 times in the entire year.  She does not use any cane or walker.  Regarding her upper extremities, she reports that she is capable of performing all tasks independently, including cutting food, brushing her teeth, combing hair, writing, typing, or doing other fine hand movements, but she is perhaps a little slower than she used to be.  Dysphagia occurs rarely, less than once per week, and does not seem to bother her.  She denies dyspnea on exertion, orthopnea, nonrefreshing sleep, or morning headaches.    Of note, last year she was diagnosed with left bundle branch block.  I told her that this is not likely to be related to inclusion body myositis, as cardiac complications are exceedingly rare in this disorder.  She had a thorough cardiology workup, including stress echo and cardiac MRI, which fortunately did not disclose any concerning findings.  She also was experiencing quite severe back pain a few months ago, during a trip to [place], to the point she could not stand.  Fortunately, this pain has improved now after conservative treatment, including PT, and judicious use of painkillers.  She never had typical sciatica; the pain was limited to the middle  "of the back. It is better now.     Maria A also tells me that she has prominently dry eyes and dry mouth.  She wonders if she has coexistent Sjogren's syndrome.  She denies upper extremity arthralgias or Raynaud's syndrome.    IBM-FRS score is 37, obtained from the patient today.  See above for details    /84 (BP Location: Left arm, Patient Position: Sitting, Cuff Size: Adult Regular)   Pulse 72   Ht 1.792 m (5' 10.55\")   Wt 68.8 kg (151 lb 9.6 oz)   SpO2 99%   BMI 21.41 kg/m      Neuro exam was not repeated.     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  She is not interested in seeing physical or occupational therapy again this year, but she will let me know if she feels that this need arises.  She has taken pottery classes, which she feels have improved her hand skills a bit.  She has no respiratory concerns, and dysphagia is minimal; it will be monitored.    We discussed research developments in [facility], including the true currently recruiting trials [id number] (one of the sites is [place], [place]) and sirolimus (not currently in [place]).  She does not feel that she has the energy to travel to [place] for the  trial at [place].  I will update her with the trial results when available, although both are not anticipated earlier than 2025.    I will check serum ABE, rheumatoid factor, SSA, and SSB antibodies, and I will place a Rheumatology referral.  I told her that even if those antibodies are negative, Sjogren's cannot be entirely ruled out, but I would like the opinion of her rheumatologist in that case before proceeding with more invasive diagnostic test such as lip biopsy.    Follow-up in 1 year, sooner if needed.    Sincerely,      Pepe Dick MD, FAAN    Total time spent on this encounter today 30 minutes of which 15 face-to-face, 10 in postvisit note dictation, " editing, and orders, and 5 in previsit chart review.    The longitudinal plan of care for the diagnosis(es)/condition(s) as documented were addressed during this visit. Due to the added complexity in care, I will continue to support Maria A in the subsequent management and with ongoing continuity of care: IBM        Again, thank you for allowing me to participate in the care of your patient.      Sincerely,    Pepe Dick MD

## 2024-03-05 LAB — ANA SER QL IF: NEGATIVE

## 2024-03-06 LAB
ENA SS-A AB SER IA-ACNC: 0.5 U/ML
ENA SS-A AB SER IA-ACNC: NEGATIVE
ENA SS-B IGG SER IA-ACNC: <0.6 U/ML
ENA SS-B IGG SER IA-ACNC: NEGATIVE

## 2024-03-08 ENCOUNTER — TELEPHONE (OUTPATIENT)
Dept: NEUROLOGY | Facility: CLINIC | Age: 54
End: 2024-03-08
Payer: COMMERCIAL

## 2024-03-08 NOTE — TELEPHONE ENCOUNTER
Caller spoke with pt regarding scheduling a one year follow up with Dr. Dick, Pt stated they will call to schedule at a later date.    3/8/24 BD

## 2024-07-27 ENCOUNTER — HEALTH MAINTENANCE LETTER (OUTPATIENT)
Age: 54
End: 2024-07-27

## 2024-08-13 RX ORDER — HYDROXYZINE HYDROCHLORIDE 50 MG/1
50 TABLET, FILM COATED ORAL 4 TIMES DAILY PRN
COMMUNITY
Start: 2023-07-07

## 2024-08-13 NOTE — PROGRESS NOTES
Rheumatology Clinic Visit  [facility]  Dianne HoyosCORTNEY lozada     Maria A Love MRN# [id number]   YOB: 1970 Age: 54 year old   Date of Visit: 8/16/2024  Primary care provider: Mila Perez          Assessment and Plan:     1.  Sicca syndrome    Patient presents today for an initial evaluation.  She was diagnosed with inclusion body myositis.  She had been noticing that muscle wasting on her legs and on her hands.  She does have weakness in her legs as well as her hands.  She finds that it is more difficult going up the stairs or utilizing strength in her hands.  She states that she has been noticing significant dry mouth particularly of the tongue.  She states her dentist does not notice dry mouth but she notices it with her tongue.  She 5 that she swallows and talks differently as her tongue just feels bigger.  Physical examination today did not show any active synovitis or dactylitis.  She does have saliva production along her gumline however her tongue does appear to be very dry on examination.  Discussed with the patient that IBM typically occurs as an isolated condition.  There have however been reports of a been associated with Sjogren syndrome.  She did have her ABE, rheumatoid factor, SSA and SSB checked which returned normal/negative.  Discussed that for the diagnosis of Sjogren's syndrome would recommend getting a minor lip biopsy.  If this is normal/negative and the likelihood for having Sjogren syndrome is remote.    Discussed with the patient that there is no specific medication that would help with the dry mouth from Sjogren's syndrome if that were her diagnosis.  There have been cases of IBM associated with Sjogren syndrome that have improved muscle strength, speech and swallowing with the utilization of immunomodulatory medication.  Discussed with the patient that if she were to be diagnosed with Sjogren's syndrome and we were to try that medication the medication would not  "be a treatment for her IBM but more so to see if she would have improvement in any of her muscle symptoms that could be coming from Sjogren's syndrome.  In the meantime I would recommend that she utilize over-the-counter products for her dry mouth.  Recommend she try the XyliMelts.  Referral was placed to ENT for minor lip biopsy.      Plan:     Schedule follow-up with Dianne Huang PA-C if biopsy is positive  Other: referral to ENT for minor lip biopsy    Dianne Huang, CORTNEY  Rheumatology         History of Present Illness:   Maria A Love presents for evaluation of sicca syndrome.  Pmhx familial inclusion body myositis.     She states that her tongue is dry \"all the time\". She states that it feels like it is bigger than it should be. She states that her dentist does not feel that she has dry mouth. She will get flare ups where her tongue is sore. She states that it is not a taste bud issue. If she bites down, it'll be sore, this is usually on the left side. She does use Biotene. She will wake up in the middle of the night and it will be very dry and she has to take a drink of water. She states that she feels she swallows differently than she has before. She thought it was from her IBM, but now she feels it is more of a saliva issue.     She states that with regards to her eyes, it was discovered when she tried to wear contacts. She does not need any eye drops, she just cannot wear contact. She has fatigue and dry skin. The fatigue can be severe at times. She states that this is not normal for her. She states that she feels some brain fog. She is perimenopausal and does not know if this is contributing. She gets flare ups of constipation sometimes, worse with traveling.     With regards to her muscles, it started with muscle wasting on her legs about 3 years ago. Her hands have been getting weaker and muscle wasting on her hands. Muscle biopsy confirmed the IBM. She does notice muscle weakness. It is harder " to go up and down the stairs. Hands are weaker. She notices a general decline.     She states that in the winter, in the grocery store, she noticed white on her finger tip. No unexplained fevers. No unusual hair loss or mouth sores. No skin rashes. No history of pericarditis or pleural effusion. Hx of LBBB, she states that she had an episode laying in bed and her heart was racing. Everything went dark and right before she passed out, she came back. She will see her PCP next week.          Review of Systems:     Constitutional: negative  Skin: negative  Eyes: as above  Ears/Nose/Throat: as above  Respiratory: No shortness of breath, dyspnea on exertion, cough, or hemoptysis  Cardiovascular: negative  Gastrointestinal: negative  Genitourinary: negative  Musculoskeletal: as above  Neurologic: negative  Psychiatric: negative  Hematologic/Lymphatic/Immunologic: negative  Endocrine: negative         Active Problem List:   There are no problems to display for this patient.           Past Medical History:   No past medical history on file.  Past Surgical History:   Procedure Laterality Date    BIOPSY MUSCLE DIAGNOSTIC (LOCATION) Left 6/6/2022    Procedure: BIOPSY, MUSCLE LEFT QUADRICEPS;  Surgeon: Jj Calvert MD;  Location: [facility] OR            Social History:     Social History     Socioeconomic History    Marital status:      Spouse name: Not on file    Number of children: Not on file    Years of education: Not on file    Highest education level: Not on file   Occupational History    Not on file   Tobacco Use    Smoking status: Never    Smokeless tobacco: Never   Substance and Sexual Activity    Alcohol use: Yes    Drug use: Never    Sexual activity: Not on file   Other Topics Concern    Not on file   Social History Narrative    Not on file     Social Determinants of Health     Financial Resource Strain: Not on file   Food Insecurity: Not on file   Transportation Needs: Not on file   Physical Activity: Not on  "file   Stress: Not on file   Social Connections: Unknown (3/14/2023)    Received [facility] [facility]    Social Connections     Frequency of Communication with Friends and Family: Not on file   Interpersonal Safety: Not on file   Housing Stability: Not on file          Family History:     Family History   Problem Relation Age of Onset    Heart Disease Mother 62    Osteoarthritis Mother     Hypertension Father     Hyperlipidemia Father     Diabetes Father     Cerebrovascular Disease Father         CVA age 78    Heart Disease Father     Heart Disease Maternal Grandmother     Hypertension Maternal Grandmother     Hyperlipidemia Maternal Grandmother     Myocardial Infarction Maternal Grandfather     Prostate Cancer Paternal Grandfather     Myocardial Infarction Maternal Uncle 35    Cerebrovascular Disease Maternal Uncle         CVA age 58            Allergies:     Allergies   Allergen Reactions    Amoxicillin Hives and Itching            Medications:     Current Outpatient Medications   Medication Sig Dispense Refill    hydrOXYzine HCl (ATARAX) 50 MG tablet Take 50 mg by mouth 4 times daily as needed for anxiety      amLODIPine (NORVASC) 5 MG tablet amlodipine 5 mg tablet   TAKE 1 TABLET DAILY      Calcium Carbonate (CALCARB 600 PO)       cholecalciferol 50 MCG (2000 UT) tablet Take by mouth daily      hydrochlorothiazide (HYDRODIURIL) 12.5 MG tablet hydrochlorothiazide 12.5 mg tablet   TAKE 1 TABLET DAILY (NEED APPOINTMENT)      Krill Oil (OMEGA-3) 500 MG CAPS Take 640 mg by mouth      zolpidem (AMBIEN) 5 MG tablet zolpidem 5 mg tablet   Take 1 tablet as needed by oral route at bedtime.              Physical Exam:   Blood pressure 118/78, pulse 69, temperature 97.5  F (36.4  C), temperature source Oral, resp. rate 16, height 1.765 m (5' 9.5\"), weight 67.6 kg (149 lb), last menstrual period 08/16/2024, SpO2 100%.  Wt Readings from Last 6 " Encounters:   03/04/24 68.8 kg (151 lb 9.6 oz)   06/23/22 65.8 kg (145 lb)   06/06/22 67.1 kg (148 lb)   03/14/22 67.6 kg (149 lb)     Constitutional: well-developed, appearing stated age; cooperative  Eyes: nl PERRLA, conjunctiva, sclera  ENT: nl external ears, nose, hearing, lips, teeth, gums, throat. No mucositis.   No mucous membrane lesions, normal saliva pool around her gumline, dry tongue  Neck: no mass or thyroid enlargement  Resp: no shortness of breath with normal conversation   Lymph: no cervical, supraclavicular or epitrochlear nodes  MS:No active synovitis or altered joint anatomy. No dactylitis,  tenosynovitis, enthesopathy.   Skin: no nail pitting, alopecia, rash, nodules or lesions.   Psych: nl judgement, orientation, memory, affect.           Data:   Imaging:   XR CHEST 2 VW, 6/23/2022   Findings:  PA and lateral views of the chest. Trachea is midline.  Cardiomediastinal silhouette is within normal limits. No acute  airspace disease. There is no pneumothorax or pleural effusion. The  upper abdomen is unremarkable. No acute osseous abnormality.     Laboratory:  3/4/2024  Rheumatoid factor less than 10  ABE negative  SSA and SSB negative

## 2024-08-16 ENCOUNTER — OFFICE VISIT (OUTPATIENT)
Dept: RHEUMATOLOGY | Facility: CLINIC | Age: 54
End: 2024-08-16
Payer: COMMERCIAL

## 2024-08-16 VITALS
SYSTOLIC BLOOD PRESSURE: 118 MMHG | HEART RATE: 69 BPM | OXYGEN SATURATION: 100 % | RESPIRATION RATE: 16 BRPM | TEMPERATURE: 97.5 F | WEIGHT: 149 LBS | BODY MASS INDEX: 21.33 KG/M2 | DIASTOLIC BLOOD PRESSURE: 78 MMHG | HEIGHT: 70 IN

## 2024-08-16 DIAGNOSIS — M35.00 SICCA SYNDROME (H): ICD-10-CM

## 2024-08-16 PROCEDURE — 99204 OFFICE O/P NEW MOD 45 MIN: CPT | Performed by: PHYSICIAN ASSISTANT

## 2024-08-16 ASSESSMENT — PAIN SCALES - GENERAL: PAINLEVEL: EXTREME PAIN (8)

## 2024-08-16 NOTE — PATIENT INSTRUCTIONS
After Visit Instructions:     Thank you for coming to [facility] for your care. It is my goal to partner with you to help you reach your optimal state of health.       Plan:     Schedule follow-up with Dianne Huang PA-C if biopsy is positive  Other: referral to ENT for minor lip biopsy      Dianne Huang PA-C  [facility]  [facility]    Contact information: [facility]  Clinic Number:  [phone]  Please call or send a [facility] message with any questions about your care

## 2024-10-08 ENCOUNTER — OFFICE VISIT (OUTPATIENT)
Dept: OBGYN | Facility: CLINIC | Age: 54
End: 2024-10-08
Payer: COMMERCIAL

## 2024-10-08 VITALS
WEIGHT: 149.6 LBS | BODY MASS INDEX: 21.42 KG/M2 | TEMPERATURE: 97.1 F | SYSTOLIC BLOOD PRESSURE: 137 MMHG | HEIGHT: 70 IN | HEART RATE: 72 BPM | RESPIRATION RATE: 18 BRPM | DIASTOLIC BLOOD PRESSURE: 86 MMHG

## 2024-10-08 DIAGNOSIS — Z12.4 CERVICAL CANCER SCREENING: Primary | ICD-10-CM

## 2024-10-08 DIAGNOSIS — N93.9 ABNORMAL UTERINE BLEEDING: ICD-10-CM

## 2024-10-08 DIAGNOSIS — N81.6 RECTOCELE: ICD-10-CM

## 2024-10-08 LAB
ERYTHROCYTE [DISTWIDTH] IN BLOOD BY AUTOMATED COUNT: 12.1 % (ref 10–15)
EST. AVERAGE GLUCOSE BLD GHB EST-MCNC: 97 MG/DL
HBA1C MFR BLD: 5 % (ref 0–5.6)
HCG UR QL: NEGATIVE
HCT VFR BLD AUTO: 47.8 % (ref 35–47)
HGB BLD-MCNC: 15.6 G/DL (ref 11.7–15.7)
INTERNAL QC OK POCT: NORMAL
MCH RBC QN AUTO: 30.6 PG (ref 26.5–33)
MCHC RBC AUTO-ENTMCNC: 32.6 G/DL (ref 31.5–36.5)
MCV RBC AUTO: 94 FL (ref 78–100)
PLATELET # BLD AUTO: 302 10E3/UL (ref 150–450)
POCT KIT EXPIRATION DATE: NORMAL
POCT KIT LOT NUMBER: NORMAL
RBC # BLD AUTO: 5.09 10E6/UL (ref 3.8–5.2)
TSH SERPL DL<=0.005 MIU/L-ACNC: 1.77 UIU/ML (ref 0.3–4.2)
WBC # BLD AUTO: 7 10E3/UL (ref 4–11)

## 2024-10-08 PROCEDURE — 83036 HEMOGLOBIN GLYCOSYLATED A1C: CPT | Performed by: OBSTETRICS & GYNECOLOGY

## 2024-10-08 PROCEDURE — 85027 COMPLETE CBC AUTOMATED: CPT | Performed by: OBSTETRICS & GYNECOLOGY

## 2024-10-08 PROCEDURE — 36415 COLL VENOUS BLD VENIPUNCTURE: CPT | Performed by: OBSTETRICS & GYNECOLOGY

## 2024-10-08 PROCEDURE — 58100 BIOPSY OF UTERUS LINING: CPT | Performed by: OBSTETRICS & GYNECOLOGY

## 2024-10-08 PROCEDURE — 99459 PELVIC EXAMINATION: CPT | Performed by: OBSTETRICS & GYNECOLOGY

## 2024-10-08 PROCEDURE — 88305 TISSUE EXAM BY PATHOLOGIST: CPT | Performed by: STUDENT IN AN ORGANIZED HEALTH CARE EDUCATION/TRAINING PROGRAM

## 2024-10-08 PROCEDURE — 99204 OFFICE O/P NEW MOD 45 MIN: CPT | Mod: 25 | Performed by: OBSTETRICS & GYNECOLOGY

## 2024-10-08 PROCEDURE — 81025 URINE PREGNANCY TEST: CPT | Performed by: OBSTETRICS & GYNECOLOGY

## 2024-10-08 PROCEDURE — G0145 SCR C/V CYTO,THINLAYER,RESCR: HCPCS | Performed by: OBSTETRICS & GYNECOLOGY

## 2024-10-08 PROCEDURE — 87624 HPV HI-RISK TYP POOLED RSLT: CPT | Performed by: OBSTETRICS & GYNECOLOGY

## 2024-10-08 PROCEDURE — 84443 ASSAY THYROID STIM HORMONE: CPT | Performed by: OBSTETRICS & GYNECOLOGY

## 2024-10-08 RX ORDER — MULTIVITAMIN WITH IRON
1 TABLET ORAL DAILY
COMMUNITY

## 2024-10-08 NOTE — PROGRESS NOTES
"[facility]    Gynecology Office Note    CC:   Chief Complaint   Patient presents with    Consult        HPI: Maria A Love is a 54 year old who presents for heavy menstrual bleeding. Patient reports extremely heavy menstrual bleeding. Unable to leave house when having the heavy bleeding, bleeding through to clothes.  Tampons \"don't do anything,\" needs heavier pads for coverage. Lots of clots. She has a history of irregular menses, typically gets about 8 per year. Over the past few years, getting menses about every 4-8 weeks. About a year ago, had an 6 month stretch with no menses, but then they have resumed about every 1-2 months since.    Also has questions about possible pelvic organ prolapse. She has a myositis condition leading to muscle wasting and weakening. Her mother has the same condition and has severe POP. Wondering if this is starting to develop for her as well. She has some stooling dysfunction. Occasionally feels like it gets \"stuck\" and has to do splinting.     GYN Hx:     Patient is menopausal: no    Patient's last menstrual period was 08/16/2024 (exact date).    Cycle: irregular, see above    Contraception: vasectomy  Last Pap Smear: last pap was due in 2018, denies history of abnormals      ROS: A 10 pt ROS was completed and found to be otherwise negative unless mentioned in the HPI.     PMH:   History reviewed. No pertinent past medical history.    PSHx:   Past Surgical History:   Procedure Laterality Date    BIOPSY MUSCLE DIAGNOSTIC (LOCATION) Left 6/6/2022    Procedure: BIOPSY, MUSCLE LEFT QUADRICEPS;  Surgeon: Jj Calvert MD;  Location: [facility] OR       OBHx:   OB History   No obstetric history on file.       Medications:   Current Outpatient Medications   Medication Sig Dispense Refill    amLODIPine (NORVASC) 5 MG tablet amlodipine 5 mg tablet   TAKE 1 TABLET DAILY      Calcium Carbonate (CALCARB 600 PO)       cholecalciferol 50 MCG (2000 UT) tablet Take by " mouth daily      hydrochlorothiazide (HYDRODIURIL) 12.5 MG tablet hydrochlorothiazide 12.5 mg tablet   TAKE 1 TABLET DAILY (NEED APPOINTMENT)      hydrOXYzine HCl (ATARAX) 50 MG tablet Take 50 mg by mouth 4 times daily as needed for anxiety      Krill Oil (OMEGA-3) 500 MG CAPS Take 640 mg by mouth      magnesium 250 MG tablet Take 1 tablet by mouth daily.      Potassium (POTASSIMIN PO) Take by mouth.      zolpidem (AMBIEN) 5 MG tablet zolpidem 5 mg tablet   Take 1 tablet as needed by oral route at bedtime.       No current facility-administered medications for this visit.       Allergies:      Allergies   Allergen Reactions    Amoxicillin Hives and Itching       Social History:   Social History     Socioeconomic History    Marital status:      Spouse name: Not on file    Number of children: Not on file    Years of education: Not on file    Highest education level: Not on file   Occupational History    Not on file   Tobacco Use    Smoking status: Never    Smokeless tobacco: Never   Substance and Sexual Activity    Alcohol use: Yes    Drug use: Never    Sexual activity: Not on file   Other Topics Concern    Not on file   Social History Narrative    Not on file     Social Determinants of Health     Financial Resource Strain: Not on file   Food Insecurity: Not on file   Transportation Needs: Not on file   Physical Activity: Not on file   Stress: Not on file   Social Connections: Unknown (3/14/2023)    Received [facility] [employer] [facility] [employer] [facility] [employer]    Social Connections     Frequency of Communication with Friends and Family: Not on file   Interpersonal Safety: Not on file   Housing Stability: Not on file         Family History:   Family History   Problem Relation Age of Onset    Heart Disease Mother 62    Osteoarthritis Mother     Hypertension Father     Hyperlipidemia Father     Diabetes Father     Cerebrovascular Disease Father         CVA age 78    Heart  "Disease Father     Heart Disease Maternal Grandmother     Hypertension Maternal Grandmother     Hyperlipidemia Maternal Grandmother     Myocardial Infarction Maternal Grandfather     Prostate Cancer Paternal Grandfather     Myocardial Infarction Maternal Uncle 35    Cerebrovascular Disease Maternal Uncle         CVA age 58       Physical Exam:   Vitals:    10/08/24 1145   BP: 137/86   BP Location: Right arm   Patient Position: Sitting   Cuff Size: Adult Regular   Pulse: 72   Resp: 18   Temp: 97.1  F (36.2  C)   TempSrc: Tympanic   Weight: 67.9 kg (149 lb 9.6 oz)   Height: 1.765 m (5' 9.5\")      Estimated body mass index is 21.78 kg/m  as calculated from the following:    Height as of this encounter: 1.765 m (5' 9.5\").    Weight as of this encounter: 67.9 kg (149 lb 9.6 oz).    General appearance: well-hydrated, A&O x 3, no apparent distress  Lungs: Equal expansion bilaterally, no accessory muscle use  Heart: No heaves or thrills.   Constitutional: See vitals  Abdomen: Soft, non-tender, non-distended. No rebound, rigidity, or guarding.  Extremities: no edema  Neuro: CN II-XII grossly intact  Genitourinary:  External genitalia: no erythema, no lesions.   Urethral meatus appropriate location without lesions or prolapse  Urethra: No masses, tenderness, or scarring  Bladder no fullness, masses, or tenderness. No prolapse   Anus and Perineum: Unremarkable, no visible lesions. Grade 1-2 rectocele  Vagina: Normal, healthy pink mucosa without any lesions. Physiologic vaginal discharge.   Cervix: normal appearance, no cervical motion tenderness.   Uterus: normal size, shape and consistency. Grade 2 prolapse    Labs/Imaging: Pelvic US 8/29/24 uterus 11.2 x 6.6 x 7.4cm, ES 9mm and homogenous, ovaries normal      Endometrial Biopsy Procedure Note      Maria A Love  1970  [id number]    Indications:    Mari aA Love is a 54 year old year old female, who is having an endometrial biopsy for abnormal uterine " bleeding    Procedure:  Is a pregnancy test required: Yes.  Was it positive or negative?  Negative    Prior to the start of the procedure, written and verbal consent was obtained from the patient. The patient was then placed in the dorsal lithotomy position.  A speculum was placed in the vagina and the cervix visualized. The cervix was cleaned with betadine swabs x3. A tenaculum was placed on the cervix. A small plastic 5 mm Pipelle syringe curette was passed through the cervix to the fundus with return of moderate amount of tissue. This was placed in specimen jar and sent for permanent pathology. All instruments were removed.      The patient tolerated the procedure well.    Post Procedure:    PLAN : Await the results of the biopsy. There were no complications. Patient was discharged in stable condition.    The patient was given post op instructions which included activity and pelvic restrictions.  She was advised to call the clinic if excessive bleeding, pelvic pain, or fever.     Follow-up based on results.         Assessment and Plan:     Encounter Diagnoses   Name Primary?    Cervical cancer screening Yes    Abnormal uterine bleeding     Rectocele      Patient presents for evaluation of abnormal uterine bleeding.  She continues to have fairly regular menses and is not menopausal.  She had a pelvic ultrasound completed at an outside facility which was normal.  For evaluation, labs and EMB were collected today.  Briefly discussed possible management options including medication and surgical options.  Patient would be most interested in medication management.  Would consider Aygestin if results from today are all normal.  Did discuss need for trial of weaning of medication in the coming years due to expected onset of menopause. Will plan follow up in 2-3 weeks to review results and further discuss which management she would like to pursue.    Patient also has concern for pelvic organ prolapse.  Her brother has  significant pelvic organ prolapse and she believes she would be at increased risk of this due to her muscle wasting myositis.  She does have grade 1-2 uterine prolapse and rectocele on exam today.  Discussed management options and she is interested in pelvic floor physical therapy, referral was placed.      Health maintenance: pap smear collected today      Dolores Summers DO

## 2024-10-08 NOTE — NURSING NOTE
"Initial /86 (BP Location: Right arm, Patient Position: Sitting, Cuff Size: Adult Regular)   Pulse 72   Temp 97.1  F (36.2  C) (Tympanic)   Resp 18   Ht 1.765 m (5' 9.5\")   Wt 67.9 kg (149 lb 9.6 oz)   LMP 08/16/2024 (Exact Date)   BMI 21.78 kg/m   Estimated body mass index is 21.78 kg/m  as calculated from the following:    Height as of this encounter: 1.765 m (5' 9.5\").    Weight as of this encounter: 67.9 kg (149 lb 9.6 oz). .    "

## 2024-10-09 LAB
HPV HR 12 DNA CVX QL NAA+PROBE: NEGATIVE
HPV16 DNA CVX QL NAA+PROBE: NEGATIVE
HPV18 DNA CVX QL NAA+PROBE: NEGATIVE
HUMAN PAPILLOMA VIRUS FINAL DIAGNOSIS: NORMAL

## 2024-10-10 ENCOUNTER — OFFICE VISIT (OUTPATIENT)
Dept: OTOLARYNGOLOGY | Facility: CLINIC | Age: 54
End: 2024-10-10
Payer: COMMERCIAL

## 2024-10-10 VITALS
SYSTOLIC BLOOD PRESSURE: 146 MMHG | TEMPERATURE: 98.6 F | OXYGEN SATURATION: 99 % | WEIGHT: 149 LBS | BODY MASS INDEX: 21.69 KG/M2 | HEART RATE: 83 BPM | DIASTOLIC BLOOD PRESSURE: 80 MMHG

## 2024-10-10 DIAGNOSIS — M35.00 SICCA SYNDROME (H): Primary | ICD-10-CM

## 2024-10-10 PROCEDURE — 40490 BIOPSY OF LIP: CPT | Performed by: OTOLARYNGOLOGY

## 2024-10-10 PROCEDURE — 88305 TISSUE EXAM BY PATHOLOGIST: CPT | Performed by: PATHOLOGY

## 2024-10-10 PROCEDURE — 99213 OFFICE O/P EST LOW 20 MIN: CPT | Mod: 25 | Performed by: OTOLARYNGOLOGY

## 2024-10-10 NOTE — LETTER
10/10/2024      Maria A Love  [address] [address] [place] [address]  [place] [address]      Dear Colleague,    Thank you for referring your patient, Maria A Love, to the [facility]. Please see a copy of my visit note below.    Maria A Love is a 54 year old female  Chief Complaint: Referral for lip biopsy for Sjogren's  History of Present Illness  Location:lip  Quality:biopsy  Severity:Sjogren's  Duration:n/a    Past Medical History - There is no problem list on file for this patient.      Current Medications -   Current Outpatient Medications:      amLODIPine (NORVASC) 5 MG tablet, amlodipine 5 mg tablet  TAKE 1 TABLET DAILY, Disp: , Rfl:      Calcium Carbonate (CALCARB 600 PO), , Disp: , Rfl:      cholecalciferol 50 MCG (2000 UT) tablet, Take by mouth daily, Disp: , Rfl:      hydrochlorothiazide (HYDRODIURIL) 12.5 MG tablet, hydrochlorothiazide 12.5 mg tablet  TAKE 1 TABLET DAILY (NEED APPOINTMENT), Disp: , Rfl:      hydrOXYzine HCl (ATARAX) 50 MG tablet, Take 50 mg by mouth 4 times daily as needed for anxiety, Disp: , Rfl:      Krill Oil (OMEGA-3) 500 MG CAPS, Take 640 mg by mouth, Disp: , Rfl:      magnesium 250 MG tablet, Take 1 tablet by mouth daily., Disp: , Rfl:      Potassium (POTASSIMIN PO), Take by mouth., Disp: , Rfl:      zolpidem (AMBIEN) 5 MG tablet, zolpidem 5 mg tablet  Take 1 tablet as needed by oral route at bedtime., Disp: , Rfl:     Allergies -   Allergies   Allergen Reactions     Amoxicillin Hives and Itching       Social History -   Social History     Socioeconomic History     Marital status:    Tobacco Use     Smoking status: Never     Smokeless tobacco: Never   Substance and Sexual Activity     Alcohol use: Yes     Drug use: Never     Social Determinants of Health      Received from [employer] [facility] [employer] [employer] [facility] [employer]    Social Connections       Family History -   Family History   Problem Relation Age of Onset      Heart Disease Mother 62     Osteoarthritis Mother      Hypertension Father      Hyperlipidemia Father      Diabetes Father      Cerebrovascular Disease Father         CVA age 78     Heart Disease Father      Heart Disease Maternal Grandmother      Hypertension Maternal Grandmother      Hyperlipidemia Maternal Grandmother      Myocardial Infarction Maternal Grandfather      Prostate Cancer Paternal Grandfather      Myocardial Infarction Maternal Uncle 35     Cerebrovascular Disease Maternal Uncle         CVA age 58       Review of Systems:   !.  Weight Loss: No   2. Difficulty Breathing: No   3. Difficulty Swallowing: No   4. Pain: No    Physical Exam  B/P: Data Unavailable, T: Data Unavailable, P: Data Unavailable, R: Data Unavailable  Vitals: BP (!) 146/80 (BP Location: Right arm, Patient Position: Sitting, Cuff Size: Adult Regular)   Pulse 83   Temp 98.6  F (37  C) (Tympanic)   Wt 67.6 kg (149 lb)   LMP 08/16/2024 (Exact Date)   SpO2 99%   BMI 21.69 kg/m    BMI= Body mass index is 21.69 kg/m .    General  Appearance - Normal  Head/Face/Scalp:    Skin - Normal    Facial Palpation - Normal    Facial Strength - Normal  Ears:    Pinna - Normal    Canal - Normal   Tympanic membrane - Normal  Nose:    External - Normal    Septum - Normal    Turbinates - Normal    Middle meatus - Normal  Oral Cavity:    Lips - Normal    Floor of Mouth - Normal    Gingiva - Normal    Tongue - Normal    Buccal - Normal    Palate - Normal  Nasopharynx:    Oropharynx:    Tonsils - Normal    Tongue base - Normal    Soft palate - Normal    Posterior pharyngeal wall - Normal  Hypopharynx:  Larynx:    Epiglottis -     Aryepiglottic folds -     Arytenoids -     False vocal cords -     True vocal cords -  Neck Masses - No  Neck lymphatics - no lymphadenopathy  Thyroid - Normal  Salivary glands - Normal    Audiogram - not applicable  Radiology - not applicable   Reports:   View films:  Procedures -   Lip biopsy - lower lip injected with  1% lidocaine with epinephrine 1;100,000. Incision through mucosa with #15 blade. Iris scissors used to remove several minor salivary glands  Patient Education:     A/P - Maria A Love is a 54 year old female  Medical Decision Making 1. Sicca syndrome 2. Lip biopsy for Sjogren's      Again, thank you for allowing me to participate in the care of your patient.        Sincerely,        Luis Fox MD

## 2024-10-10 NOTE — PROGRESS NOTES
Maria A Love is a 54 year old female  Chief Complaint: Referral for lip biopsy for Sjogren's  History of Present Illness  Location:lip  Quality:biopsy  Severity:Sjogren's  Duration:n/a    Past Medical History - There is no problem list on file for this patient.      Current Medications -   Current Outpatient Medications:     amLODIPine (NORVASC) 5 MG tablet, amlodipine 5 mg tablet  TAKE 1 TABLET DAILY, Disp: , Rfl:     Calcium Carbonate (CALCARB 600 PO), , Disp: , Rfl:     cholecalciferol 50 MCG (2000 UT) tablet, Take by mouth daily, Disp: , Rfl:     hydrochlorothiazide (HYDRODIURIL) 12.5 MG tablet, hydrochlorothiazide 12.5 mg tablet  TAKE 1 TABLET DAILY (NEED APPOINTMENT), Disp: , Rfl:     hydrOXYzine HCl (ATARAX) 50 MG tablet, Take 50 mg by mouth 4 times daily as needed for anxiety, Disp: , Rfl:     Krill Oil (OMEGA-3) 500 MG CAPS, Take 640 mg by mouth, Disp: , Rfl:     magnesium 250 MG tablet, Take 1 tablet by mouth daily., Disp: , Rfl:     Potassium (POTASSIMIN PO), Take by mouth., Disp: , Rfl:     zolpidem (AMBIEN) 5 MG tablet, zolpidem 5 mg tablet  Take 1 tablet as needed by oral route at bedtime., Disp: , Rfl:     Allergies -   Allergies   Allergen Reactions    Amoxicillin Hives and Itching       Social History -   Social History     Socioeconomic History    Marital status:    Tobacco Use    Smoking status: Never    Smokeless tobacco: Never   Substance and Sexual Activity    Alcohol use: Yes    Drug use: Never     Social Determinants of Health      [facility] [employer] [facility] [employer] [facility]    Social Connections       Family History -   Family History   Problem Relation Age of Onset    Heart Disease Mother 62    Osteoarthritis Mother     Hypertension Father     Hyperlipidemia Father     Diabetes Father     Cerebrovascular Disease Father         CVA age 78    Heart Disease Father     Heart Disease Maternal Grandmother     Hypertension  Maternal Grandmother     Hyperlipidemia Maternal Grandmother     Myocardial Infarction Maternal Grandfather     Prostate Cancer Paternal Grandfather     Myocardial Infarction Maternal Uncle 35    Cerebrovascular Disease Maternal Uncle         CVA age 58       Review of Systems:   !.  Weight Loss: No   2. Difficulty Breathing: No   3. Difficulty Swallowing: No   4. Pain: No    Physical Exam  B/P: Data Unavailable, T: Data Unavailable, P: Data Unavailable, R: Data Unavailable  Vitals: BP (!) 146/80 (BP Location: Right arm, Patient Position: Sitting, Cuff Size: Adult Regular)   Pulse 83   Temp 98.6  F (37  C) (Tympanic)   Wt 67.6 kg (149 lb)   LMP 08/16/2024 (Exact Date)   SpO2 99%   BMI 21.69 kg/m    BMI= Body mass index is 21.69 kg/m .    General  Appearance - Normal  Head/Face/Scalp:    Skin - Normal    Facial Palpation - Normal    Facial Strength - Normal  Ears:    Pinna - Normal    Canal - Normal   Tympanic membrane - Normal  Nose:    External - Normal    Septum - Normal    Turbinates - Normal    Middle meatus - Normal  Oral Cavity:    Lips - Normal    Floor of Mouth - Normal    Gingiva - Normal    Tongue - Normal    Buccal - Normal    Palate - Normal  Nasopharynx:    Oropharynx:    Tonsils - Normal    Tongue base - Normal    Soft palate - Normal    Posterior pharyngeal wall - Normal  Hypopharynx:  Larynx:    Epiglottis -     Aryepiglottic folds -     Arytenoids -     False vocal cords -     True vocal cords -  Neck Masses - No  Neck lymphatics - no lymphadenopathy  Thyroid - Normal  Salivary glands - Normal    Audiogram - not applicable  Radiology - not applicable   Reports:   View films:  Procedures -   Lip biopsy - lower lip injected with 1% lidocaine with epinephrine 1;100,000. Incision through mucosa with #15 blade. Iris scissors used to remove several minor salivary glands  Patient Education:     A/P - Maria A Love is a 54 year old female  Medical Decision Making 1. Sicca syndrome 2. Lip biopsy  for Sjogren's

## 2024-10-10 NOTE — PROGRESS NOTES
"Initial BP (!) 146/80 (BP Location: Right arm, Patient Position: Sitting, Cuff Size: Adult Regular)   Pulse 83   Temp 98.6  F (37  C) (Tympanic)   Wt 67.6 kg (149 lb)   LMP 08/16/2024 (Exact Date)   SpO2 99%   BMI 21.69 kg/m   Estimated body mass index is 21.69 kg/m  as calculated from the following:    Height as of 10/8/24: 1.765 m (5' 9.5\").    Weight as of this encounter: 67.6 kg (149 lb). .    "

## 2024-10-10 NOTE — NURSING NOTE
"Initial BP (!) 146/80 (BP Location: Right arm, Patient Position: Sitting, Cuff Size: Adult Regular)   Pulse 83   Temp 98.6  F (37  C) (Tympanic)   Wt 67.6 kg (149 lb)   LMP 08/16/2024 (Exact Date)   SpO2 99%   BMI 21.69 kg/m   Estimated body mass index is 21.69 kg/m  as calculated from the following:    Height as of 10/8/24: 1.765 m (5' 9.5\").    Weight as of this encounter: 67.6 kg (149 lb). .  Candy Solis MA on 10/10/2024 at 10:28 AM    "

## 2024-10-11 LAB
BKR AP ASSOCIATED HPV REPORT: NORMAL
BKR LAB AP GYN ADEQUACY: NORMAL
BKR LAB AP GYN INTERPRETATION: NORMAL
BKR LAB AP PREVIOUS ABNORMAL: NORMAL
PATH REPORT.COMMENTS IMP SPEC: NORMAL
PATH REPORT.FINAL DX SPEC: NORMAL
PATH REPORT.GROSS SPEC: NORMAL
PATH REPORT.MICROSCOPIC SPEC OTHER STN: NORMAL
PATH REPORT.RELEVANT HX SPEC: NORMAL
PATH REPORT.RELEVANT HX SPEC: NORMAL
PHOTO IMAGE: NORMAL

## 2024-10-14 LAB
PATH REPORT.COMMENTS IMP SPEC: NORMAL
PATH REPORT.COMMENTS IMP SPEC: NORMAL
PATH REPORT.FINAL DX SPEC: NORMAL
PATH REPORT.GROSS SPEC: NORMAL
PATH REPORT.MICROSCOPIC SPEC OTHER STN: NORMAL
PATH REPORT.RELEVANT HX SPEC: NORMAL
PHOTO IMAGE: NORMAL

## 2024-10-22 ENCOUNTER — OFFICE VISIT (OUTPATIENT)
Dept: OBGYN | Facility: CLINIC | Age: 54
End: 2024-10-22
Payer: COMMERCIAL

## 2024-10-22 VITALS
HEIGHT: 70 IN | RESPIRATION RATE: 18 BRPM | DIASTOLIC BLOOD PRESSURE: 77 MMHG | TEMPERATURE: 97.6 F | WEIGHT: 147.6 LBS | BODY MASS INDEX: 21.13 KG/M2 | SYSTOLIC BLOOD PRESSURE: 139 MMHG | HEART RATE: 79 BPM

## 2024-10-22 DIAGNOSIS — N93.9 ABNORMAL UTERINE BLEEDING (AUB): Primary | ICD-10-CM

## 2024-10-22 PROCEDURE — 99213 OFFICE O/P EST LOW 20 MIN: CPT | Performed by: OBSTETRICS & GYNECOLOGY

## 2024-10-22 RX ORDER — NORETHINDRONE 5 MG/1
5 TABLET ORAL DAILY
Qty: 90 TABLET | Refills: 3 | Status: SHIPPED | OUTPATIENT
Start: 2024-10-22

## 2024-10-22 NOTE — NURSING NOTE
"Initial /77 (BP Location: Right arm, Patient Position: Sitting, Cuff Size: Adult Regular)   Pulse 79   Temp 97.6  F (36.4  C) (Tympanic)   Resp 18   Ht 1.765 m (5' 9.5\")   Wt 67 kg (147 lb 9.6 oz)   BMI 21.48 kg/m   Estimated body mass index is 21.48 kg/m  as calculated from the following:    Height as of this encounter: 1.765 m (5' 9.5\").    Weight as of this encounter: 67 kg (147 lb 9.6 oz). .    "

## 2024-10-22 NOTE — PROGRESS NOTES
[facility]    Gynecology Office Note    CC:   Chief Complaint   Patient presents with    Follow Up        HPI: Maria A Love is a 54 year old who presents for follow up for abnormal uterine bleeding. Patient has a history of very heavy menstrual bleeding. Still getting fairly regular menses. See prior note for details. Additional concerns include low libido, vaginal dryness, mood swings, mental fogginess, dry eyes and mouth. Wondering if these are hormonal symptoms.        GYN Hx:     Patient is menopausal: no     Cycle: increasingly irregular, very heavy     Contraception: vasectomy  Last Pap Smear: 10/2024 NIL, HPV negative, denies history of abnormals    ROS: A 10 pt ROS was completed and found to be otherwise negative unless mentioned in the HPI.     PMH:   History reviewed. No pertinent past medical history.    PSHx:   Past Surgical History:   Procedure Laterality Date    BIOPSY MUSCLE DIAGNOSTIC (LOCATION) Left 6/6/2022    Procedure: BIOPSY, MUSCLE LEFT QUADRICEPS;  Surgeon: Jj Calvert MD;  Location: [facility] [place]       OBHx:   OB History   No obstetric history on file.       Medications:   Current Outpatient Medications   Medication Sig Dispense Refill    amLODIPine (NORVASC) 5 MG tablet amlodipine 5 mg tablet   TAKE 1 TABLET DAILY      Calcium Carbonate (CALCARB 600 PO)       cholecalciferol 50 MCG (2000 UT) tablet Take by mouth daily      hydrochlorothiazide (HYDRODIURIL) 12.5 MG tablet hydrochlorothiazide 12.5 mg tablet   TAKE 1 TABLET DAILY (NEED APPOINTMENT)      hydrOXYzine HCl (ATARAX) 50 MG tablet Take 50 mg by mouth 4 times daily as needed for anxiety      Krill Oil (OMEGA-3) 500 MG CAPS Take 640 mg by mouth      magnesium 250 MG tablet Take 1 tablet by mouth daily.      norethindrone (AYGESTIN) 5 MG tablet Take 1 tablet (5 mg) by mouth daily. 90 tablet 3    Potassium (POTASSIMIN PO) Take by mouth.      zolpidem (AMBIEN) 5 MG tablet zolpidem 5 mg tablet   Take 1 tablet  as needed by oral route at bedtime.       No current facility-administered medications for this visit.       Allergies:      Allergies   Allergen Reactions    Amoxicillin Hives and Itching       Social History:   Social History     Socioeconomic History    Marital status:      Spouse name: Not on file    Number of children: Not on file    Years of education: Not on file    Highest education level: Not on file   Occupational History    Not on file   Tobacco Use    Smoking status: Never    Smokeless tobacco: Never   Substance and Sexual Activity    Alcohol use: Yes    Drug use: Never    Sexual activity: Not on file   Other Topics Concern    Not on file   Social History Narrative    Not on file     Social Determinants of Health     Financial Resource Strain: Not on file   Food Insecurity: Not on file   Transportation Needs: Not on file   Physical Activity: Not on file   Stress: Not on file   Social Connections: Unknown (3/14/2023)    [facility] [employer] [employer] [facility]    Social Connections     Frequency of Communication with Friends and Family: Not on file   Interpersonal Safety: Not on file   Housing Stability: Not on file         Family History:   Family History   Problem Relation Age of Onset    Heart Disease Mother 62    Osteoarthritis Mother     Hypertension Father     Hyperlipidemia Father     Diabetes Father     Cerebrovascular Disease Father         CVA age 78    Heart Disease Father     Heart Disease Maternal Grandmother     Hypertension Maternal Grandmother     Hyperlipidemia Maternal Grandmother     Myocardial Infarction Maternal Grandfather     Prostate Cancer Paternal Grandfather     Myocardial Infarction Maternal Uncle 35    Cerebrovascular Disease Maternal Uncle         CVA age 58       Physical Exam:   Vitals:    10/22/24 1023   BP: 139/77   BP Location: Right arm   Patient Position: Sitting   Cuff Size: Adult Regular   Pulse:  "79   Resp: 18   Temp: 97.6  F (36.4  C)   TempSrc: Tympanic   Weight: 67 kg (147 lb 9.6 oz)   Height: 1.765 m (5' 9.5\")      Estimated body mass index is 21.48 kg/m  as calculated from the following:    Height as of this encounter: 1.765 m (5' 9.5\").    Weight as of this encounter: 67 kg (147 lb 9.6 oz).    General appearance: well-hydrated, A&O x 3, no apparent distress  Lungs: Equal expansion bilaterally, no accessory muscle use  Heart: No heaves or thrills.   Constitutional: See vitals  Neuro: CN II-XII grossly intact    Labs/Imaging:   10/8/2024:  EMB benign  TSH, Hba1c, CBC WNL  Pap smear NIL, HPV negative    Assessment and Plan:     Maria A was seen today for follow up.    Diagnoses and all orders for this visit:    Abnormal uterine bleeding (AUB)  -     norethindrone (AYGESTIN) 5 MG tablet; Take 1 tablet (5 mg) by mouth daily.      Discussed results from evaluation for patient's abnormal uterine bleeding. These rule out a number of other etiologies. Would suspect AUB from perimenopausal hormonal changes and possible anovulatory cycles. We had previously discussed medication and surgical intervention options to help with her bleeding. She is most interested in trying a medication. Wondering about hormone replacement therapy for possible help with her other symptoms as well. Will try Aygestin to help with her bleeding due to severity of symptoms. Will give 2-3 months to see if this helps with her other symptoms as well. Also discussed adjuvant treatments, too. Offered vaginal estrogen cream for the vaginal dryness, she will defer for now. Discussed other vaginal moisturizers and lubricants for intercourse to help with the dryness.    Plan follow up in 2-3 months if no improvement.     Dolores Summers,         "

## 2024-11-04 ENCOUNTER — THERAPY VISIT (OUTPATIENT)
Dept: PHYSICAL THERAPY | Facility: CLINIC | Age: 54
End: 2024-11-04
Attending: OBSTETRICS & GYNECOLOGY
Payer: COMMERCIAL

## 2024-11-04 DIAGNOSIS — N81.6 RECTOCELE: ICD-10-CM

## 2024-11-04 DIAGNOSIS — N39.46 URINARY INCONTINENCE, MIXED: Primary | ICD-10-CM

## 2024-11-04 PROCEDURE — 97110 THERAPEUTIC EXERCISES: CPT | Mod: GP

## 2024-11-04 PROCEDURE — 97530 THERAPEUTIC ACTIVITIES: CPT | Mod: GP

## 2024-11-04 PROCEDURE — 97535 SELF CARE MNGMENT TRAINING: CPT | Mod: GP

## 2024-11-04 PROCEDURE — 97162 PT EVAL MOD COMPLEX 30 MIN: CPT | Mod: GP

## 2024-11-04 NOTE — PROGRESS NOTES
PHYSICAL THERAPY EVALUATION  Type of Visit: Evaluation       Fall Risk Screen:  Fall screen completed by: PT  Have you fallen 2 or more times in the past year?: No  Have you fallen and had an injury in the past year?: No  Is patient a fall risk?: No    Subjective         Presenting condition or subjective complaint: (Patient-Rptd) Lessening bladder control and rectocele. 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 She is noticing impact on her quads and hands as well as an impact on smooth muscle for bladder and digestive systems.   Date of onset: 11/04/24    Relevant medical history: (Patient-Rptd) High blood pressure; Progressive neurological deficits   Dates & types of surgery: (Patient-Rptd) Gall bladder removal 7/7/2023    Prior diagnostic imaging/testing results:       Prior therapy history for the same diagnosis, illness or injury: (Patient-Rptd) No      Living Environment  Social support: (Patient-Rptd) With a significant other or spouse   Type of home: (Patient-Rptd) House; Multi-level   Stairs to enter the home: (Patient-Rptd) Yes (Patient-Rptd) 3 Is there a railing: (Patient-Rptd) Yes     Ramp: (Patient-Rptd) No   Stairs inside the home: (Patient-Rptd) Yes (Patient-Rptd) 12 Is there a railing: (Patient-Rptd) Yes     Help at home: (Patient-Rptd) Home management tasks (cooking, cleaning)  Equipment owned:       Employment: (Patient-Rptd) No    Hobbies/Interests: (Patient-Rptd) Pottery, yoga    Patient goals for therapy: (Patient-Rptd) Jump on a trampoline or do jumping jacks.    Pain assessment: See objective evaluation for additional pain details     Objective      PELVIC EVALUATION  ADDITIONAL HISTORY:  Sex assigned at birth: (Patient-Rptd) Female  Gender identity: (Patient-Rptd) Female    Pronouns: (Patient-Rptd) She/Her Hers      Bladder History:  Feels bladder filling: (Patient-Rptd) Yes  Triggers for feeling of inability to wait to go to the bathroom: (Patient-Rptd) Yes  (Patient-Rptd) Being near the toilet specially if already have to go.   How long can you wait to urinate: (Patient-Rptd) Not sure. Able to hold if necessary but trying not to hold too long  Gets up at night to urinate: (Patient-Rptd) No    Can stop the flow of urine when urinating: (Patient-Rptd) Sometimes  Volume of urine usually released: (Patient-Rptd) Average   Other issues:    Number of bladder infections in last 12 months:    Fluid intake per day: (Patient-Rptd) 60 oz (Patient-Rptd) 4 oz  espresso.   Medications taken for bladder: (Patient-Rptd) No     Activities causing urine leak: (Patient-Rptd) Cough; Sneeze; Jump; Hurrying to the bathroom due to a strong urge to urinate (pee)  100% of the time for cough/sneeze and progressed in frequency and volume.   Amount of urine typically leaked: (Patient-Rptd) A few drops  Pads used to help with leaking: (Patient-Rptd) No        Bowel History:  Frequency of bowel movement: (Patient-Rptd) Once every day or two.  Consistency of stool: (Patient-Rptd) Hard  type 1 - 3 typically.   Ignores the urge to defecate: (Patient-Rptd) No - using body awareness to determine if she is going to try.   Other bowel issues: (Patient-Rptd) Straining to have bowel movement - occ but more like it's not much. Occ having to use manual assistnace and pressure externally.   Length of time spent trying to have a bowel movement: (Patient-Rptd) Sometimes 5 minutes or so.     Sexual Function History:  Sexual orientation: (Patient-Rptd) Straight    Sexually active: (Patient-Rptd) Yes  Lubrication used: (Patient-Rptd) Yes (Patient-Rptd) Yes  Pelvic pain:      Pain or difficulty with orgasms/erection/ejaculation: (Patient-Rptd) No    State of menopause: (Patient-Rptd) Perimenopause (have not gone through menopause yet)  Hormone medications: (Patient-Rptd) Yes (Patient-Rptd) Aygestin 5mg - primarily to stop menstrual cycle.     Are you currently pregnant: (Patient-Rptd) No  Number of previous  pregnancies: (Patient-Rptd) 3  Number of deliveries: (Patient-Rptd) 2  If you have delivered before, did you have any of these issues during delivery: (Patient-Rptd) Vaginal delivery  Have you been diagnosed with pelvic prolapse or abdominal separation: (Patient-Rptd) No  Do you get regular exercise: (Patient-Rptd) No  Have you tried pelvic floor strengthening exercises for 4 weeks: (Patient-Rptd) No  Do you have any history of trauma that is relevant to your care that you d like to share: (Patient-Rptd) No      Discussed reason for referral regarding pelvic health needs and external/internal pelvic floor muscle examination with patient/guardian.  Opportunity provided to ask questions and verbal consent for assessment and intervention was given.    POSTURE: WNL  LUMBAR SCREEN: AROM WNL  HIP SCREEN:  Strength: WNL - slight reduction in endurance of strength on R compared to L.       PELVIC/SI SCREEN:  Amee (March): WNL B    BREATHING SYMMETRY: WNL    PELVIC EXAM  External Visual Inspection:  At rest: Normal  With voluntary pelvic floor contraction: Present, no elevation.   Relaxation of PFM: Yes  With intra-abdominal pressure: Bearing down as defecation: Perineal descent, bulging of tissues surrounding the EAS and vaginal tissues.     Integumentary:   Unremarkable.     External Digital Palpation per Perineum:   Ischiocavernosis: decreased mm bulk on R compared to L  Bulbo cavernosis: decreased mm bulk on R compared to L  Transverse perineal: Unremarkable  Levator ani: Unremarkable  Perineal body: Unremarkable    Internal Digital Palpation:  Per Vagina:  Myofascial Resistance to Palpation: Soft  Digital Muscle Performance: P (Power): initially 3/5. Increased to 4+/5 with coaching.   E (Endurance): 7 seconds.   F (Fast Twitch): 7 in 10 seconds.   Compensations: Adductors  Relaxation Post-Contraction: Normal      Pelvic Organ Prolapse:   Anterior: weakening of anterior wall.   Posterior: weakening of posterior wall.      Assessment & Plan   CLINICAL IMPRESSIONS  Medical Diagnosis: Rectocele, mixed urinary incontinence    Treatment Diagnosis: Rectocele, mixed urinary incontinence   Impression/Assessment: Patient is a 54 year old female with urinary incontinence and rectocele complaints.  The following significant findings have been identified: Decreased strength, Decreased proprioception, Impaired muscle performance, and Decreased activity tolerance. These impairments interfere with their ability to perform self care tasks, recreational activities, household chores, driving , household mobility, and community mobility as compared to previous level of function.     Clinical Decision Making (Complexity):  Clinical Presentation: Evolving/Changing  Clinical Presentation Rationale: based on medical and personal factors listed in PT evaluation  Clinical Decision Making (Complexity): Moderate complexity    PLAN OF CARE  Treatment Interventions:  Modalities: Biofeedback, E-stim  Interventions: Gait Training, Manual Therapy, Neuromuscular Re-education, Therapeutic Activity, Therapeutic Exercise, Self-Care/Home Management    Long Term Goals     PT Goal 1  Goal Identifier: LTG 1  Goal Description: PT will report urinary leakage with cough or sneeze no more than 5% of the time in order to improve continence throughout the day.  Target Date: 01/27/25  PT Goal 2  Goal Identifier: LTG 3  Goal Description: Pt will report straining for BM's less than 5% of the time in order to improve mechanics and reduce prolapse symptoms.  Target Date: 01/27/25      Frequency of Treatment: 1x/week  Duration of Treatment: 12 week    Education Assessment:   Learner/Method: No Barriers to Learning    Risks and benefits of evaluation/treatment have been explained.   Patient/Family/caregiver agrees with Plan of Care.     Evaluation Time:     PT Eval, Low Complexity Minutes (71183): 30     Signing Clinician: Oralia House, PT

## 2024-12-04 ENCOUNTER — THERAPY VISIT (OUTPATIENT)
Dept: PHYSICAL THERAPY | Facility: CLINIC | Age: 54
End: 2024-12-04
Payer: COMMERCIAL

## 2024-12-04 DIAGNOSIS — N81.6 RECTOCELE: ICD-10-CM

## 2024-12-04 DIAGNOSIS — N39.46 URINARY INCONTINENCE, MIXED: Primary | ICD-10-CM

## 2024-12-04 PROCEDURE — 97112 NEUROMUSCULAR REEDUCATION: CPT | Mod: GP

## 2024-12-04 PROCEDURE — 97110 THERAPEUTIC EXERCISES: CPT | Mod: GP

## 2024-12-04 PROCEDURE — 97535 SELF CARE MNGMENT TRAINING: CPT | Mod: GP

## 2024-12-18 ENCOUNTER — THERAPY VISIT (OUTPATIENT)
Dept: PHYSICAL THERAPY | Facility: CLINIC | Age: 54
End: 2024-12-18
Payer: COMMERCIAL

## 2024-12-18 DIAGNOSIS — N81.6 RECTOCELE: ICD-10-CM

## 2024-12-18 DIAGNOSIS — N39.46 URINARY INCONTINENCE, MIXED: Primary | ICD-10-CM

## 2024-12-18 PROCEDURE — 97112 NEUROMUSCULAR REEDUCATION: CPT | Mod: GP

## 2024-12-18 PROCEDURE — 97110 THERAPEUTIC EXERCISES: CPT | Mod: GP

## 2025-04-16 ENCOUNTER — PATIENT OUTREACH (OUTPATIENT)
Dept: OBGYN | Facility: CLINIC | Age: 55
End: 2025-04-16
Payer: COMMERCIAL

## 2025-04-16 NOTE — LETTER
2025      Maria A Love  [address] [address] [place] [id number]              Dear Maria A,      To ensure we are providing the best quality care, we have reviewed your chart and see that you are due for:    Breast Cancer Screening:    Please call [facility]  at [phone] to schedule a Mammogram.    Colon Cancer Screening:    If you have received a referral to schedule a Colonoscopy or choose to do a Colonoscopy instead of the FIT/ Cologuard test, please call [phone] to schedule.    If you have received a FIT test kit from a prior office visit, please check the expiration date. If , please get a new kit from the Lab/ Clinic. If not , please complete the test and mail in as soon as you are able.    If you would prefer to complete a Cologuard test, please reach out to your provider to see if this is an option for you.    You may call Dept: [phone] if you have any questions. If you have completed the tests outside of [facility], please have the results forwarded to our office Fax # [phone]. We will update the chart for your primary Physician to review before your next annual physical.        Sincerely,    Dolores Summers MD

## 2025-04-16 NOTE — TELEPHONE ENCOUNTER
"Panel Management Review        Health Maintenance List    Health Maintenance   Topic Date Due    ADVANCE CARE PLANNING  Never done    YEARLY PREVENTIVE VISIT  Never done    COLORECTAL CANCER SCREENING  Never done    HIV SCREENING  Never done    HEPATITIS C SCREENING  Never done    HEPATITIS B IMMUNIZATION (1 of 3 - 19+ 3-dose series) Never done    LIPID  Never done    MAMMO SCREENING  06/30/2011    Pneumococcal Vaccine: 50+ Years (1 of 1 - PCV) Never done    ZOSTER IMMUNIZATION (1 of 2) Never done    INFLUENZA VACCINE (1) 09/01/2024    COVID-19 Vaccine (4 - 2024-25 season) 09/01/2024    PHQ-2 (once per calendar year)  01/01/2025    DIABETES SCREENING  10/08/2027    HPV TEST  10/08/2029    PAP  10/08/2029    DTAP/TDAP/TD IMMUNIZATION (4 - Td or Tdap) 10/24/2029    HPV IMMUNIZATION  Aged Out    MENINGITIS IMMUNIZATION  Aged Out       Composite cancer screening  Chart review shows that this patient is due/due soon for the following Mammogram and Colonoscopy  No results found for: \"PAP\"  Past Surgical History:   Procedure Laterality Date    BIOPSY MUSCLE DIAGNOSTIC (LOCATION) Left 6/6/2022    Procedure: BIOPSY, MUSCLE LEFT QUADRICEPS;  Surgeon: Jj Calvert MD;  [place] [facility] [place]       Is hysterectomy listed in surgical history? No   Is mastectomy listed in surgical history? No     Summary:    Patient is due/failing the following:   Mammogram and Colonoscopy    Action needed: Patient needs office visit for Mammogram, colonoscopy.    Type of outreach:  Sent [employer] message.      Staff Signature:  Antonia Clarke CMA      "

## 2025-06-08 ENCOUNTER — HEALTH MAINTENANCE LETTER (OUTPATIENT)
Age: 55
End: 2025-06-08

## 2025-08-10 ENCOUNTER — HEALTH MAINTENANCE LETTER (OUTPATIENT)
Age: 55
End: 2025-08-10

## (undated) DEVICE — PACK MINOR CUSTOM ASC

## (undated) DEVICE — SU VICRYL 3-0 SH 27" UND J416H

## (undated) DEVICE — SOL NACL 0.9% IRRIG 500ML BOTTLE 2F7123

## (undated) DEVICE — DRSG GAUZE 4X4" TRAY 6939

## (undated) DEVICE — LINEN ORTHO PACK 5446

## (undated) DEVICE — TAPE MICROFOAM 4" 1528-4

## (undated) DEVICE — LIGHT HANDLE X1 31140133

## (undated) DEVICE — SU MONOCRYL 4-0 PS-2 27" UND Y426H

## (undated) DEVICE — TONGUE DEPRESSOR STERILE 25-705-ALL

## (undated) DEVICE — DRSG STERI STRIP 1/2X4" R1547

## (undated) DEVICE — SPECIMEN CONTAINER URINE 90ML STERILE 75.1435.002

## (undated) DEVICE — GLOVE PROTEXIS W/NEU-THERA 7.5  2D73TE75

## (undated) DEVICE — PREP CHLORAPREP W/ORANGE TINT 10.5ML 260715

## (undated) RX ORDER — LIDOCAINE HYDROCHLORIDE 10 MG/ML
INJECTION, SOLUTION EPIDURAL; INFILTRATION; INTRACAUDAL; PERINEURAL
Status: DISPENSED
Start: 2022-06-06

## (undated) RX ORDER — LIDOCAINE HYDROCHLORIDE 20 MG/ML
SOLUTION OROPHARYNGEAL
Status: DISPENSED
Start: 2022-06-23